# Patient Record
Sex: MALE | Race: WHITE | NOT HISPANIC OR LATINO | Employment: OTHER | URBAN - METROPOLITAN AREA
[De-identification: names, ages, dates, MRNs, and addresses within clinical notes are randomized per-mention and may not be internally consistent; named-entity substitution may affect disease eponyms.]

---

## 2017-04-07 ENCOUNTER — OFFICE VISIT - RIVER FALLS (OUTPATIENT)
Dept: FAMILY MEDICINE | Facility: CLINIC | Age: 67
End: 2017-04-07

## 2017-09-13 ENCOUNTER — OFFICE VISIT - RIVER FALLS (OUTPATIENT)
Dept: FAMILY MEDICINE | Facility: CLINIC | Age: 67
End: 2017-09-13

## 2017-09-13 ASSESSMENT — MIFFLIN-ST. JEOR: SCORE: 1879.61

## 2017-09-14 LAB
CHOLEST SERPL-MCNC: 193 MG/DL
CHOLEST/HDLC SERPL: 4.1 {RATIO}
CREAT SERPL-MCNC: 0.92 MG/DL (ref 0.7–1.25)
GLUCOSE BLD-MCNC: 86 MG/DL (ref 65–99)
HBA1C MFR BLD: 5.3 %
HDLC SERPL-MCNC: 47 MG/DL
LDLC SERPL CALC-MCNC: 109 MG/DL
NONHDLC SERPL-MCNC: 146 MG/DL
PSA SERPL-MCNC: 2.6 NG/ML
TRIGL SERPL-MCNC: 242 MG/DL

## 2017-09-25 ENCOUNTER — COMMUNICATION - RIVER FALLS (OUTPATIENT)
Dept: FAMILY MEDICINE | Facility: CLINIC | Age: 67
End: 2017-09-25

## 2018-09-05 ENCOUNTER — OFFICE VISIT - RIVER FALLS (OUTPATIENT)
Dept: FAMILY MEDICINE | Facility: CLINIC | Age: 68
End: 2018-09-05

## 2018-09-05 ASSESSMENT — MIFFLIN-ST. JEOR: SCORE: 1814.64

## 2018-09-06 LAB
CHOLEST SERPL-MCNC: 175 MG/DL
CHOLEST/HDLC SERPL: 4 {RATIO}
CREAT SERPL-MCNC: 1 MG/DL (ref 0.7–1.25)
GLUCOSE BLD-MCNC: 108 MG/DL (ref 65–99)
HDLC SERPL-MCNC: 44 MG/DL
LDLC SERPL CALC-MCNC: 97 MG/DL
NONHDLC SERPL-MCNC: 131 MG/DL
PSA SERPL-MCNC: 2.7 NG/ML
TRIGL SERPL-MCNC: 222 MG/DL

## 2018-10-10 ENCOUNTER — AMBULATORY - RIVER FALLS (OUTPATIENT)
Dept: FAMILY MEDICINE | Facility: CLINIC | Age: 68
End: 2018-10-10

## 2018-11-15 ENCOUNTER — OFFICE VISIT - RIVER FALLS (OUTPATIENT)
Dept: FAMILY MEDICINE | Facility: CLINIC | Age: 68
End: 2018-11-15

## 2018-11-15 ASSESSMENT — MIFFLIN-ST. JEOR: SCORE: 1792.3

## 2018-11-29 ENCOUNTER — AMBULATORY - RIVER FALLS (OUTPATIENT)
Dept: FAMILY MEDICINE | Facility: CLINIC | Age: 68
End: 2018-11-29

## 2018-12-01 LAB
CREAT SERPL-MCNC: 0.92 MG/DL (ref 0.7–1.25)
GLUCOSE BLD-MCNC: 106 MG/DL (ref 65–99)

## 2019-01-02 ENCOUNTER — AMBULATORY - RIVER FALLS (OUTPATIENT)
Dept: FAMILY MEDICINE | Facility: CLINIC | Age: 69
End: 2019-01-02

## 2019-01-05 LAB
A/G RATIO - HISTORICAL: 2 (ref 1–2.5)
ALBUMIN SERPL-MCNC: 4.4 GM/DL (ref 3.6–5.1)
ALP SERPL-CCNC: 90 UNIT/L (ref 40–115)
ALT SERPL W P-5'-P-CCNC: 43 UNIT/L (ref 9–46)
AST SERPL W P-5'-P-CCNC: 31 UNIT/L (ref 10–35)
BASOPHILS # BLD MANUAL: 350 10*3/UL (ref 0–200)
BASOPHILS NFR BLD MANUAL: 1.1 %
BILIRUB SERPL-MCNC: 0.5 MG/DL (ref 0.2–1.2)
BUN SERPL-MCNC: 19 MG/DL (ref 7–25)
BUN/CREAT RATIO - HISTORICAL: ABNORMAL (ref 6–22)
CALCIUM SERPL-MCNC: 9.3 MG/DL (ref 8.6–10.3)
CHLORIDE BLD-SCNC: 106 MMOL/L (ref 98–110)
CO2 SERPL-SCNC: 27 MMOL/L (ref 20–32)
CREAT SERPL-MCNC: 0.96 MG/DL (ref 0.7–1.25)
EGFRCR SERPLBLD CKD-EPI 2021: 82 ML/MIN/1.73M2
EOSINOPHIL # BLD MANUAL: 0 10*3/UL (ref 15–500)
EOSINOPHIL NFR BLD MANUAL: 0 %
ERYTHROCYTE [DISTWIDTH] IN BLOOD BY AUTOMATED COUNT: 12.5 % (ref 11–15)
GLOBULIN: 2.2 (ref 1.9–3.7)
GLUCOSE BLD-MCNC: 100 MG/DL (ref 65–99)
HCT VFR BLD AUTO: 41.7 % (ref 38.5–50)
HGB BLD-MCNC: 14.2 GM/DL (ref 13.2–17.1)
LACTATE DEHYDROGENASE - 2: 32 % (ref 30–43)
LDH SERPL L TO P-CCNC: 126 UNIT/L (ref 120–250)
LDH1 CFR SERPL ELPH: 23 % (ref 19–38)
LDH3 CFR SERPL ELPH: 23 % (ref 16–26)
LDH4 CFR SERPL ELPH: 10 % (ref 3–12)
LDH5 CFR SERPL ELPH: 12 % (ref 3–14)
LYMPHOCYTES # BLD MANUAL: ABNORMAL 10*3/UL (ref 850–3900)
LYMPHOCYTES NFR BLD MANUAL: 88.5 %
MCH RBC QN AUTO: 31.1 PG (ref 27–33)
MCHC RBC AUTO-ENTMCNC: 34.1 GM/DL (ref 32–36)
MCV RBC AUTO: 91.2 FL (ref 80–100)
MONOCYTES # BLD MANUAL: 1654 10*3/UL (ref 200–950)
MONOCYTES NFR BLD MANUAL: 5.2 %
NEUTROPHILS # BLD MANUAL: 1654 10*3/UL (ref 1500–7800)
NEUTROPHILS NFR BLD MANUAL: 5.2 %
PLATELET # BLD AUTO: 216 10*3/UL (ref 140–400)
PMV BLD: 10.7 FL (ref 7.5–12.5)
POTASSIUM BLD-SCNC: 4.9 MMOL/L (ref 3.5–5.3)
PROT SERPL-MCNC: 6.6 GM/DL (ref 6.1–8.1)
RBC # BLD AUTO: 4.57 10*6/UL (ref 4.2–5.8)
SODIUM SERPL-SCNC: 141 MMOL/L (ref 135–146)
WBC # BLD AUTO: 31.8 10*3/UL (ref 3.8–10.8)

## 2019-01-17 ENCOUNTER — OFFICE VISIT - RIVER FALLS (OUTPATIENT)
Dept: FAMILY MEDICINE | Facility: CLINIC | Age: 69
End: 2019-01-17

## 2019-01-17 ASSESSMENT — MIFFLIN-ST. JEOR: SCORE: 1803.19

## 2019-02-04 ENCOUNTER — COMMUNICATION - RIVER FALLS (OUTPATIENT)
Dept: FAMILY MEDICINE | Facility: CLINIC | Age: 69
End: 2019-02-04

## 2019-02-04 ENCOUNTER — OFFICE VISIT - RIVER FALLS (OUTPATIENT)
Dept: FAMILY MEDICINE | Facility: CLINIC | Age: 69
End: 2019-02-04

## 2019-02-04 LAB
A/G RATIO - HISTORICAL: 1.7 (ref 1–2)
ALBUMIN SERPL-MCNC: 4.4 G/DL (ref 3.2–4.6)
ALP SERPL-CCNC: 86 IU/L (ref 50–136)
ALT SERPL W P-5'-P-CCNC: 43 IU/L (ref 8–45)
ANION GAP SERPL CALCULATED.3IONS-SCNC: 9 MMOL/L (ref 5–18)
AST SERPL W P-5'-P-CCNC: 30 IU/L (ref 2–40)
BILIRUB DIRECT SERPL-MCNC: 0.2 MG/DL (ref 0.1–0.5)
BILIRUB INDIRECT SERPL-MCNC: 0.2 MG/DL (ref 0.2–0.8)
BILIRUB SERPL-MCNC: 0.4 MG/DL (ref 0.2–1.2)
BUN SERPL-MCNC: 19 MG/DL (ref 8–25)
BUN/CREAT RATIO - HISTORICAL: 22 (ref 10–20)
CALCIUM SERPL-MCNC: 9.8 MG/DL (ref 8.5–10.5)
CHLORIDE BLD-SCNC: 109 MMOL/L (ref 98–110)
CO2 SERPL-SCNC: 25 MMOL/L (ref 21–31)
CREAT SERPL-MCNC: 0.86 MG/DL (ref 0.72–1.25)
GFR ESTIMATE EXT - HISTORICAL: >60
GLOBULIN: 2.6 G/DL (ref 2–3.7)
GLUCOSE BLD-MCNC: 105 MG/DL (ref 65–100)
POTASSIUM BLD-SCNC: 4.7 MMOL/L (ref 3.5–5)
PROT SERPL-MCNC: 7 G/DL (ref 6–8)
SODIUM SERPL-SCNC: 143 MMOL/L (ref 135–145)
TROPONIN I - HISTORICAL: <0.01 NG/ML

## 2019-02-08 LAB
BASOPHILS # BLD MANUAL: 96 10*3/UL (ref 0–200)
BASOPHILS NFR BLD MANUAL: 0.3 %
EOSINOPHIL # BLD MANUAL: 96 10*3/UL (ref 15–500)
EOSINOPHIL NFR BLD MANUAL: 0.3 %
ERYTHROCYTE [DISTWIDTH] IN BLOOD BY AUTOMATED COUNT: 13 % (ref 11–15)
HCT VFR BLD AUTO: 40.8 % (ref 38.5–50)
HGB BLD-MCNC: 13.9 GM/DL (ref 13.2–17.1)
LACTATE DEHYDROGENASE - 2: 34 % (ref 30–43)
LDH SERPL L TO P-CCNC: 127 UNIT/L (ref 120–250)
LDH1 CFR SERPL ELPH: 24 % (ref 19–38)
LDH3 CFR SERPL ELPH: 23 % (ref 16–26)
LDH4 CFR SERPL ELPH: 9 % (ref 3–12)
LDH5 CFR SERPL ELPH: 10 % (ref 3–14)
LYMPHOCYTES # BLD MANUAL: ABNORMAL 10*3/UL (ref 850–3900)
LYMPHOCYTES NFR BLD MANUAL: 77 %
MCH RBC QN AUTO: 31.2 PG (ref 27–33)
MCHC RBC AUTO-ENTMCNC: 34.1 GM/DL (ref 32–36)
MCV RBC AUTO: 91.7 FL (ref 80–100)
MONOCYTES # BLD MANUAL: 3008 10*3/UL (ref 200–950)
MONOCYTES NFR BLD MANUAL: 9.4 %
NEUTROPHILS # BLD MANUAL: 4160 10*3/UL (ref 1500–7800)
NEUTROPHILS NFR BLD MANUAL: 13 %
PLATELET # BLD AUTO: 207 10*3/UL (ref 140–400)
PMV BLD: 11 FL (ref 7.5–12.5)
RBC # BLD AUTO: 4.45 10*6/UL (ref 4.2–5.8)
WBC # BLD AUTO: 32 10*3/UL (ref 3.8–10.8)

## 2019-03-04 ENCOUNTER — AMBULATORY - RIVER FALLS (OUTPATIENT)
Dept: FAMILY MEDICINE | Facility: CLINIC | Age: 69
End: 2019-03-04

## 2019-03-09 LAB
A/G RATIO - HISTORICAL: 2.3 (ref 1–2.5)
ALBUMIN SERPL-MCNC: 4.5 GM/DL (ref 3.6–5.1)
ALP SERPL-CCNC: 77 UNIT/L (ref 40–115)
ALT SERPL W P-5'-P-CCNC: 21 UNIT/L (ref 9–46)
AST SERPL W P-5'-P-CCNC: 18 UNIT/L (ref 10–35)
BASOPHILS # BLD MANUAL: 103 10*3/UL (ref 0–200)
BASOPHILS NFR BLD MANUAL: 0.3 %
BILIRUB SERPL-MCNC: 0.6 MG/DL (ref 0.2–1.2)
BUN SERPL-MCNC: 15 MG/DL (ref 7–25)
BUN/CREAT RATIO - HISTORICAL: ABNORMAL (ref 6–22)
CALCIUM SERPL-MCNC: 9.4 MG/DL (ref 8.6–10.3)
CHLORIDE BLD-SCNC: 106 MMOL/L (ref 98–110)
CO2 SERPL-SCNC: 27 MMOL/L (ref 20–32)
CREAT SERPL-MCNC: 0.94 MG/DL (ref 0.7–1.25)
EGFRCR SERPLBLD CKD-EPI 2021: 84 ML/MIN/1.73M2
EOSINOPHIL # BLD MANUAL: 172 10*3/UL (ref 15–500)
EOSINOPHIL NFR BLD MANUAL: 0.5 %
ERYTHROCYTE [DISTWIDTH] IN BLOOD BY AUTOMATED COUNT: 12.9 % (ref 11–15)
GLOBULIN: 2 (ref 1.9–3.7)
GLUCOSE BLD-MCNC: 103 MG/DL (ref 65–99)
HCT VFR BLD AUTO: 40.3 % (ref 38.5–50)
HGB BLD-MCNC: 13.8 GM/DL (ref 13.2–17.1)
LACTATE DEHYDROGENASE - 2: 35 % (ref 30–43)
LDH SERPL L TO P-CCNC: 123 UNIT/L (ref 120–250)
LDH1 CFR SERPL ELPH: 25 % (ref 19–38)
LDH3 CFR SERPL ELPH: 24 % (ref 16–26)
LDH4 CFR SERPL ELPH: 8 % (ref 3–12)
LDH5 CFR SERPL ELPH: 8 % (ref 3–14)
LYMPHOCYTES # BLD MANUAL: ABNORMAL 10*3/UL (ref 850–3900)
LYMPHOCYTES NFR BLD MANUAL: 84.6 %
MCH RBC QN AUTO: 31.2 PG (ref 27–33)
MCHC RBC AUTO-ENTMCNC: 34.2 GM/DL (ref 32–36)
MCV RBC AUTO: 91.2 FL (ref 80–100)
MONOCYTES # BLD MANUAL: 2958 10*3/UL (ref 200–950)
MONOCYTES NFR BLD MANUAL: 8.6 %
NEUTROPHILS # BLD MANUAL: 2064 10*3/UL (ref 1500–7800)
NEUTROPHILS NFR BLD MANUAL: 6 %
PLATELET # BLD AUTO: 195 10*3/UL (ref 140–400)
PMV BLD: 10.5 FL (ref 7.5–12.5)
POTASSIUM BLD-SCNC: 4.4 MMOL/L (ref 3.5–5.3)
PROT SERPL-MCNC: 6.5 GM/DL (ref 6.1–8.1)
RBC # BLD AUTO: 4.42 10*6/UL (ref 4.2–5.8)
SODIUM SERPL-SCNC: 140 MMOL/L (ref 135–146)
WBC # BLD AUTO: 34.4 10*3/UL (ref 3.8–10.8)

## 2019-04-02 ENCOUNTER — AMBULATORY - RIVER FALLS (OUTPATIENT)
Dept: FAMILY MEDICINE | Facility: CLINIC | Age: 69
End: 2019-04-02

## 2019-04-06 LAB
A/G RATIO - HISTORICAL: 2.3 (ref 1–2.5)
ALBUMIN SERPL-MCNC: 4.6 GM/DL (ref 3.6–5.1)
ALP SERPL-CCNC: 89 UNIT/L (ref 40–115)
ALT SERPL W P-5'-P-CCNC: 22 UNIT/L (ref 9–46)
AST SERPL W P-5'-P-CCNC: 19 UNIT/L (ref 10–35)
BASOPHILS # BLD MANUAL: 151 10*3/UL (ref 0–200)
BASOPHILS NFR BLD MANUAL: 0.4 %
BILIRUB SERPL-MCNC: 0.6 MG/DL (ref 0.2–1.2)
BUN SERPL-MCNC: 25 MG/DL (ref 7–25)
BUN/CREAT RATIO - HISTORICAL: ABNORMAL (ref 6–22)
CALCIUM SERPL-MCNC: 9.1 MG/DL (ref 8.6–10.3)
CHLORIDE BLD-SCNC: 107 MMOL/L (ref 98–110)
CO2 SERPL-SCNC: 25 MMOL/L (ref 20–32)
CREAT SERPL-MCNC: 1 MG/DL (ref 0.7–1.25)
EGFRCR SERPLBLD CKD-EPI 2021: 78 ML/MIN/1.73M2
EOSINOPHIL # BLD MANUAL: 38 10*3/UL (ref 15–500)
EOSINOPHIL NFR BLD MANUAL: 0.1 %
ERYTHROCYTE [DISTWIDTH] IN BLOOD BY AUTOMATED COUNT: 14.7 % (ref 11–15)
GLOBULIN: 2 (ref 1.9–3.7)
GLUCOSE BLD-MCNC: 100 MG/DL (ref 65–99)
HCT VFR BLD AUTO: 40.7 % (ref 38.5–50)
HGB BLD-MCNC: 14.1 GM/DL (ref 13.2–17.1)
LACTATE DEHYDROGENASE - 2: 35 % (ref 30–43)
LDH SERPL L TO P-CCNC: 124 UNIT/L (ref 120–250)
LDH1 CFR SERPL ELPH: 24 % (ref 19–38)
LDH3 CFR SERPL ELPH: 23 % (ref 16–26)
LDH4 CFR SERPL ELPH: 9 % (ref 3–12)
LDH5 CFR SERPL ELPH: 9 % (ref 3–14)
LYMPHOCYTES # BLD MANUAL: ABNORMAL 10*3/UL (ref 850–3900)
LYMPHOCYTES NFR BLD MANUAL: 79.4 %
MCH RBC QN AUTO: 32.7 PG (ref 27–33)
MCHC RBC AUTO-ENTMCNC: 34.6 GM/DL (ref 32–36)
MCV RBC AUTO: 94.4 FL (ref 80–100)
MONOCYTES # BLD MANUAL: 5746 10*3/UL (ref 200–950)
MONOCYTES NFR BLD MANUAL: 15.2 %
NEUTROPHILS # BLD MANUAL: 1852 10*3/UL (ref 1500–7800)
NEUTROPHILS NFR BLD MANUAL: 4.9 %
PLATELET # BLD AUTO: 183 10*3/UL (ref 140–400)
PMV BLD: 11.1 FL (ref 7.5–12.5)
POTASSIUM BLD-SCNC: 4.3 MMOL/L (ref 3.5–5.3)
PROT SERPL-MCNC: 6.6 GM/DL (ref 6.1–8.1)
RBC # BLD AUTO: 4.31 10*6/UL (ref 4.2–5.8)
SODIUM SERPL-SCNC: 139 MMOL/L (ref 135–146)
WBC # BLD AUTO: 37.8 10*3/UL (ref 3.8–10.8)

## 2019-05-02 ENCOUNTER — AMBULATORY - RIVER FALLS (OUTPATIENT)
Dept: FAMILY MEDICINE | Facility: CLINIC | Age: 69
End: 2019-05-02

## 2019-05-04 LAB
A/G RATIO - HISTORICAL: 2 (ref 1–2.5)
ALBUMIN SERPL-MCNC: 4.5 GM/DL (ref 3.6–5.1)
ALP SERPL-CCNC: 93 UNIT/L (ref 40–115)
ALT SERPL W P-5'-P-CCNC: 27 UNIT/L (ref 9–46)
AST SERPL W P-5'-P-CCNC: 21 UNIT/L (ref 10–35)
BASOPHILS # BLD MANUAL: 200 10*3/UL (ref 0–200)
BASOPHILS NFR BLD MANUAL: 0.5 %
BILIRUB SERPL-MCNC: 0.6 MG/DL (ref 0.2–1.2)
BUN SERPL-MCNC: 22 MG/DL (ref 7–25)
BUN/CREAT RATIO - HISTORICAL: ABNORMAL (ref 6–22)
CALCIUM SERPL-MCNC: 9.5 MG/DL (ref 8.6–10.3)
CHLORIDE BLD-SCNC: 105 MMOL/L (ref 98–110)
CO2 SERPL-SCNC: 27 MMOL/L (ref 20–32)
CREAT SERPL-MCNC: 1.02 MG/DL (ref 0.7–1.25)
EGFRCR SERPLBLD CKD-EPI 2021: 76 ML/MIN/1.73M2
EOSINOPHIL # BLD MANUAL: 40 10*3/UL (ref 15–500)
EOSINOPHIL NFR BLD MANUAL: 0.1 %
ERYTHROCYTE [DISTWIDTH] IN BLOOD BY AUTOMATED COUNT: 15 % (ref 11–15)
GLOBULIN: 2.3 (ref 1.9–3.7)
GLUCOSE BLD-MCNC: 107 MG/DL (ref 65–99)
HCT VFR BLD AUTO: 41.2 % (ref 38.5–50)
HGB BLD-MCNC: 14.5 GM/DL (ref 13.2–17.1)
LACTATE DEHYDROGENASE - 2: 34 % (ref 30–43)
LDH SERPL L TO P-CCNC: 132 UNIT/L (ref 120–250)
LDH1 CFR SERPL ELPH: 24 % (ref 19–38)
LDH3 CFR SERPL ELPH: 23 % (ref 16–26)
LDH4 CFR SERPL ELPH: 9 % (ref 3–12)
LDH5 CFR SERPL ELPH: 10 % (ref 3–14)
LYMPHOCYTES # BLD MANUAL: ABNORMAL 10*3/UL (ref 850–3900)
LYMPHOCYTES NFR BLD MANUAL: 85.1 %
MCH RBC QN AUTO: 34.2 PG (ref 27–33)
MCHC RBC AUTO-ENTMCNC: 35.2 GM/DL (ref 32–36)
MCV RBC AUTO: 97.2 FL (ref 80–100)
MONOCYTES # BLD MANUAL: 4560 10*3/UL (ref 200–950)
MONOCYTES NFR BLD MANUAL: 11.4 %
NEUTROPHILS # BLD MANUAL: 1160 10*3/UL (ref 1500–7800)
NEUTROPHILS NFR BLD MANUAL: 2.9 %
PLATELET # BLD AUTO: 199 10*3/UL (ref 140–400)
PMV BLD: 10.6 FL (ref 7.5–12.5)
POTASSIUM BLD-SCNC: 4.6 MMOL/L (ref 3.5–5.3)
PROT SERPL-MCNC: 6.8 GM/DL (ref 6.1–8.1)
RBC # BLD AUTO: 4.24 10*6/UL (ref 4.2–5.8)
SODIUM SERPL-SCNC: 140 MMOL/L (ref 135–146)
WBC # BLD AUTO: 40 10*3/UL (ref 3.8–10.8)

## 2019-06-07 ENCOUNTER — COMMUNICATION - RIVER FALLS (OUTPATIENT)
Dept: FAMILY MEDICINE | Facility: CLINIC | Age: 69
End: 2019-06-07

## 2019-07-02 ENCOUNTER — AMBULATORY - RIVER FALLS (OUTPATIENT)
Dept: FAMILY MEDICINE | Facility: CLINIC | Age: 69
End: 2019-07-02

## 2019-07-03 LAB
A/G RATIO - HISTORICAL: 2.3 (ref 1–2.5)
ALBUMIN SERPL-MCNC: 4.5 GM/DL (ref 3.6–5.1)
ALP SERPL-CCNC: 81 UNIT/L (ref 40–115)
ALT SERPL W P-5'-P-CCNC: 25 UNIT/L (ref 9–46)
AST SERPL W P-5'-P-CCNC: 24 UNIT/L (ref 10–35)
BASOPHILS # BLD MANUAL: 0 10*3/UL (ref 0–200)
BASOPHILS NFR BLD MANUAL: 0 %
BILIRUB SERPL-MCNC: 0.7 MG/DL (ref 0.2–1.2)
BUN SERPL-MCNC: 23 MG/DL (ref 7–25)
BUN/CREAT RATIO - HISTORICAL: ABNORMAL (ref 6–22)
CALCIUM SERPL-MCNC: 9.2 MG/DL (ref 8.6–10.3)
CHLORIDE BLD-SCNC: 106 MMOL/L (ref 98–110)
CO2 SERPL-SCNC: 25 MMOL/L (ref 20–32)
CREAT SERPL-MCNC: 0.99 MG/DL (ref 0.7–1.25)
EGFRCR SERPLBLD CKD-EPI 2021: 78 ML/MIN/1.73M2
EOSINOPHIL # BLD MANUAL: 0 10*3/UL (ref 15–500)
EOSINOPHIL NFR BLD MANUAL: 0 %
ERYTHROCYTE [DISTWIDTH] IN BLOOD BY AUTOMATED COUNT: 12.9 % (ref 11–15)
GLOBULIN: 2 (ref 1.9–3.7)
GLUCOSE BLD-MCNC: 104 MG/DL (ref 65–99)
HCT VFR BLD AUTO: 40 % (ref 38.5–50)
HGB BLD-MCNC: 13.5 GM/DL (ref 13.2–17.1)
LDH SERPL L TO P-CCNC: 124 UNIT/L (ref 120–250)
LYMPHOCYTES # BLD MANUAL: ABNORMAL 10*3/UL (ref 850–3900)
LYMPHOCYTES NFR BLD MANUAL: 92 %
MCH RBC QN AUTO: 33.4 PG (ref 27–33)
MCHC RBC AUTO-ENTMCNC: 33.8 GM/DL (ref 32–36)
MCV RBC AUTO: 99 FL (ref 80–100)
MONOCYTES # BLD MANUAL: 2285 10*3/UL (ref 200–950)
MONOCYTES NFR BLD MANUAL: 5 %
NEUTROPHILS # BLD MANUAL: 1371 10*3/UL (ref 1500–7800)
NEUTROPHILS NFR BLD MANUAL: 3 %
PLATELET # BLD AUTO: 173 10*3/UL (ref 140–400)
PMV BLD: 11.1 FL (ref 7.5–12.5)
POTASSIUM BLD-SCNC: 4.8 MMOL/L (ref 3.5–5.3)
PROT SERPL-MCNC: 6.5 GM/DL (ref 6.1–8.1)
RBC # BLD AUTO: 4.04 10*6/UL (ref 4.2–5.8)
SODIUM SERPL-SCNC: 139 MMOL/L (ref 135–146)
WBC # BLD AUTO: 45.7 10*3/UL (ref 3.8–10.8)

## 2019-08-09 ENCOUNTER — COMMUNICATION - RIVER FALLS (OUTPATIENT)
Dept: FAMILY MEDICINE | Facility: CLINIC | Age: 69
End: 2019-08-09

## 2019-09-05 ENCOUNTER — AMBULATORY - RIVER FALLS (OUTPATIENT)
Dept: FAMILY MEDICINE | Facility: CLINIC | Age: 69
End: 2019-09-05

## 2019-09-06 LAB
A/G RATIO - HISTORICAL: 2.1 (ref 1–2.5)
ALBUMIN SERPL-MCNC: 4.7 GM/DL (ref 3.6–5.1)
ALP SERPL-CCNC: 98 UNIT/L (ref 40–115)
ALT SERPL W P-5'-P-CCNC: 28 UNIT/L (ref 9–46)
AST SERPL W P-5'-P-CCNC: 22 UNIT/L (ref 10–35)
BASOPHILS # BLD MANUAL: 0 10*3/UL (ref 0–200)
BASOPHILS NFR BLD MANUAL: 0 %
BILIRUB SERPL-MCNC: 0.9 MG/DL (ref 0.2–1.2)
BUN SERPL-MCNC: 16 MG/DL (ref 7–25)
BUN/CREAT RATIO - HISTORICAL: ABNORMAL (ref 6–22)
CALCIUM SERPL-MCNC: 9.8 MG/DL (ref 8.6–10.3)
CHLORIDE BLD-SCNC: 104 MMOL/L (ref 98–110)
CHOLEST SERPL-MCNC: 167 MG/DL
CHOLEST/HDLC SERPL: 3.8 {RATIO}
CO2 SERPL-SCNC: 25 MMOL/L (ref 20–32)
CREAT SERPL-MCNC: 1.07 MG/DL (ref 0.7–1.25)
EGFRCR SERPLBLD CKD-EPI 2021: 71 ML/MIN/1.73M2
EOSINOPHIL # BLD MANUAL: 0 10*3/UL (ref 15–500)
EOSINOPHIL NFR BLD MANUAL: 0 %
ERYTHROCYTE [DISTWIDTH] IN BLOOD BY AUTOMATED COUNT: 12.5 % (ref 11–15)
GLOBULIN: 2.2 (ref 1.9–3.7)
GLUCOSE BLD-MCNC: 103 MG/DL (ref 65–99)
HCT VFR BLD AUTO: 44.4 % (ref 38.5–50)
HDLC SERPL-MCNC: 44 MG/DL
HGB BLD-MCNC: 14.7 GM/DL (ref 13.2–17.1)
LDH SERPL L TO P-CCNC: 125 UNIT/L (ref 120–250)
LDLC SERPL CALC-MCNC: 97 MG/DL
LYMPHOCYTES # BLD MANUAL: ABNORMAL 10*3/UL (ref 850–3900)
LYMPHOCYTES NFR BLD MANUAL: 94.9 %
MCH RBC QN AUTO: 32.2 PG (ref 27–33)
MCHC RBC AUTO-ENTMCNC: 33.1 GM/DL (ref 32–36)
MCV RBC AUTO: 97.2 FL (ref 80–100)
MONOCYTES # BLD MANUAL: 1146 10*3/UL (ref 200–950)
MONOCYTES NFR BLD MANUAL: 2 %
NEUTROPHILS # BLD MANUAL: 1776 10*3/UL (ref 1500–7800)
NEUTROPHILS NFR BLD MANUAL: 3.1 %
NONHDLC SERPL-MCNC: 123 MG/DL
PLATELET # BLD AUTO: 206 10*3/UL (ref 140–400)
PMV BLD: 11.5 FL (ref 7.5–12.5)
POTASSIUM BLD-SCNC: 4.9 MMOL/L (ref 3.5–5.3)
PROT SERPL-MCNC: 6.9 GM/DL (ref 6.1–8.1)
PSA SERPL-MCNC: 3.3 NG/ML
RBC # BLD AUTO: 4.57 10*6/UL (ref 4.2–5.8)
SODIUM SERPL-SCNC: 140 MMOL/L (ref 135–146)
TRIGL SERPL-MCNC: 158 MG/DL
WBC # BLD AUTO: 57.3 10*3/UL (ref 3.8–10.8)

## 2019-09-11 ENCOUNTER — COMMUNICATION - RIVER FALLS (OUTPATIENT)
Dept: FAMILY MEDICINE | Facility: CLINIC | Age: 69
End: 2019-09-11

## 2019-09-30 ENCOUNTER — OFFICE VISIT - RIVER FALLS (OUTPATIENT)
Dept: FAMILY MEDICINE | Facility: CLINIC | Age: 69
End: 2019-09-30

## 2019-09-30 ASSESSMENT — MIFFLIN-ST. JEOR: SCORE: 1843.1

## 2019-10-01 ENCOUNTER — COMMUNICATION - RIVER FALLS (OUTPATIENT)
Dept: FAMILY MEDICINE | Facility: CLINIC | Age: 69
End: 2019-10-01

## 2019-10-01 LAB — PSA SERPL-MCNC: 3.2 NG/ML

## 2019-10-09 ENCOUNTER — COMMUNICATION - RIVER FALLS (OUTPATIENT)
Dept: FAMILY MEDICINE | Facility: CLINIC | Age: 69
End: 2019-10-09

## 2019-11-06 ENCOUNTER — AMBULATORY - RIVER FALLS (OUTPATIENT)
Dept: FAMILY MEDICINE | Facility: CLINIC | Age: 69
End: 2019-11-06

## 2019-11-07 LAB
A/G RATIO - HISTORICAL: 2 (ref 1–2.5)
ALBUMIN SERPL-MCNC: 4.4 GM/DL (ref 3.6–5.1)
ALP SERPL-CCNC: 98 UNIT/L (ref 40–115)
ALT SERPL W P-5'-P-CCNC: 41 UNIT/L (ref 9–46)
AST SERPL W P-5'-P-CCNC: 27 UNIT/L (ref 10–35)
BASOPHILS # BLD MANUAL: 0 10*3/UL (ref 0–200)
BASOPHILS NFR BLD MANUAL: 0 %
BILIRUB SERPL-MCNC: 0.5 MG/DL (ref 0.2–1.2)
BUN SERPL-MCNC: 17 MG/DL (ref 7–25)
BUN/CREAT RATIO - HISTORICAL: ABNORMAL (ref 6–22)
CALCIUM SERPL-MCNC: 9.3 MG/DL (ref 8.6–10.3)
CHLORIDE BLD-SCNC: 106 MMOL/L (ref 98–110)
CO2 SERPL-SCNC: 28 MMOL/L (ref 20–32)
CREAT SERPL-MCNC: 0.91 MG/DL (ref 0.7–1.25)
EGFRCR SERPLBLD CKD-EPI 2021: 87 ML/MIN/1.73M2
EOSINOPHIL # BLD MANUAL: 615 10*3/UL (ref 15–500)
EOSINOPHIL NFR BLD MANUAL: 1 %
ERYTHROCYTE [DISTWIDTH] IN BLOOD BY AUTOMATED COUNT: 13 % (ref 11–15)
GLOBULIN: 2.2 (ref 1.9–3.7)
GLUCOSE BLD-MCNC: 101 MG/DL (ref 65–99)
HCT VFR BLD AUTO: 39.5 % (ref 38.5–50)
HGB BLD-MCNC: 13.7 GM/DL (ref 13.2–17.1)
LDH SERPL L TO P-CCNC: 117 UNIT/L (ref 120–250)
LYMPHOCYTES # BLD MANUAL: ABNORMAL 10*3/UL (ref 850–3900)
LYMPHOCYTES NFR BLD MANUAL: 91 %
MCH RBC QN AUTO: 32.7 PG (ref 27–33)
MCHC RBC AUTO-ENTMCNC: 34.7 GM/DL (ref 32–36)
MCV RBC AUTO: 94.3 FL (ref 80–100)
MONOCYTES # BLD MANUAL: 3075 10*3/UL (ref 200–950)
MONOCYTES NFR BLD MANUAL: 5 %
NEUTROPHILS # BLD MANUAL: 1845 10*3/UL (ref 1500–7800)
NEUTROPHILS NFR BLD MANUAL: 3 %
PLATELET # BLD AUTO: 164 10*3/UL (ref 140–400)
PMV BLD: 11 FL (ref 7.5–12.5)
POTASSIUM BLD-SCNC: 4 MMOL/L (ref 3.5–5.3)
PROT SERPL-MCNC: 6.6 GM/DL (ref 6.1–8.1)
RBC # BLD AUTO: 4.19 10*6/UL (ref 4.2–5.8)
SODIUM SERPL-SCNC: 140 MMOL/L (ref 135–146)
WBC # BLD AUTO: 61.5 10*3/UL (ref 3.8–10.8)

## 2019-12-16 ENCOUNTER — COMMUNICATION - RIVER FALLS (OUTPATIENT)
Dept: FAMILY MEDICINE | Facility: CLINIC | Age: 69
End: 2019-12-16

## 2020-01-06 ENCOUNTER — AMBULATORY - RIVER FALLS (OUTPATIENT)
Dept: FAMILY MEDICINE | Facility: CLINIC | Age: 70
End: 2020-01-06

## 2020-01-07 LAB
A/G RATIO - HISTORICAL: 2.1 (ref 1–2.5)
ALBUMIN SERPL-MCNC: 4.7 GM/DL (ref 3.6–5.1)
ALP SERPL-CCNC: 111 UNIT/L (ref 40–115)
ALT SERPL W P-5'-P-CCNC: 28 UNIT/L (ref 9–46)
AST SERPL W P-5'-P-CCNC: 24 UNIT/L (ref 10–35)
BASOPHILS # BLD MANUAL: 0 10*3/UL (ref 0–200)
BASOPHILS NFR BLD MANUAL: 0 %
BILIRUB SERPL-MCNC: 0.6 MG/DL (ref 0.2–1.2)
BUN SERPL-MCNC: 19 MG/DL (ref 7–25)
BUN/CREAT RATIO - HISTORICAL: ABNORMAL (ref 6–22)
CALCIUM SERPL-MCNC: 9.7 MG/DL (ref 8.6–10.3)
CHLORIDE BLD-SCNC: 104 MMOL/L (ref 98–110)
CO2 SERPL-SCNC: 31 MMOL/L (ref 20–32)
CREAT SERPL-MCNC: 1.01 MG/DL (ref 0.7–1.25)
EGFRCR SERPLBLD CKD-EPI 2021: 77 ML/MIN/1.73M2
EOSINOPHIL # BLD MANUAL: 1530 10*3/UL (ref 15–500)
EOSINOPHIL NFR BLD MANUAL: 2 %
ERYTHROCYTE [DISTWIDTH] IN BLOOD BY AUTOMATED COUNT: 13 % (ref 11–15)
GLOBULIN: 2.2 (ref 1.9–3.7)
GLUCOSE BLD-MCNC: 104 MG/DL (ref 65–99)
HCT VFR BLD AUTO: 40.3 % (ref 38.5–50)
HGB BLD-MCNC: 13.9 GM/DL (ref 13.2–17.1)
LDH SERPL L TO P-CCNC: 116 UNIT/L (ref 120–250)
LYMPHOCYTES # BLD MANUAL: ABNORMAL 10*3/UL (ref 850–3900)
LYMPHOCYTES NFR BLD MANUAL: 90.8 %
MCH RBC QN AUTO: 33.3 PG (ref 27–33)
MCHC RBC AUTO-ENTMCNC: 34.5 GM/DL (ref 32–36)
MCV RBC AUTO: 96.4 FL (ref 80–100)
MONOCYTES # BLD MANUAL: 3137 10*3/UL (ref 200–950)
MONOCYTES NFR BLD MANUAL: 4.1 %
NEUTROPHILS # BLD MANUAL: 2372 10*3/UL (ref 1500–7800)
NEUTROPHILS NFR BLD MANUAL: 3.1 %
PLATELET # BLD AUTO: 156 10*3/UL (ref 140–400)
PMV BLD: 11.3 FL (ref 7.5–12.5)
POTASSIUM BLD-SCNC: 4.9 MMOL/L (ref 3.5–5.3)
PROT SERPL-MCNC: 6.9 GM/DL (ref 6.1–8.1)
RBC # BLD AUTO: 4.18 10*6/UL (ref 4.2–5.8)
SODIUM SERPL-SCNC: 139 MMOL/L (ref 135–146)
WBC # BLD AUTO: 76.5 10*3/UL (ref 3.8–10.8)

## 2020-03-06 ENCOUNTER — COMMUNICATION - RIVER FALLS (OUTPATIENT)
Dept: FAMILY MEDICINE | Facility: CLINIC | Age: 70
End: 2020-03-06

## 2020-03-19 ENCOUNTER — AMBULATORY - RIVER FALLS (OUTPATIENT)
Dept: FAMILY MEDICINE | Facility: CLINIC | Age: 70
End: 2020-03-19

## 2020-03-20 LAB
A/G RATIO - HISTORICAL: 2.4 (ref 1–2.5)
ALBUMIN SERPL-MCNC: 4.8 GM/DL (ref 3.6–5.1)
ALP SERPL-CCNC: 118 UNIT/L (ref 35–144)
ALT SERPL W P-5'-P-CCNC: 26 UNIT/L (ref 9–46)
AST SERPL W P-5'-P-CCNC: 22 UNIT/L (ref 10–35)
BASOPHILS # BLD MANUAL: 976 10*3/UL (ref 0–200)
BASOPHILS NFR BLD MANUAL: 1 %
BILIRUB SERPL-MCNC: 0.5 MG/DL (ref 0.2–1.2)
BUN SERPL-MCNC: 19 MG/DL (ref 7–25)
BUN/CREAT RATIO - HISTORICAL: ABNORMAL (ref 6–22)
CALCIUM SERPL-MCNC: 9.6 MG/DL (ref 8.6–10.3)
CHLORIDE BLD-SCNC: 105 MMOL/L (ref 98–110)
CO2 SERPL-SCNC: 28 MMOL/L (ref 20–32)
CREAT SERPL-MCNC: 1.03 MG/DL (ref 0.7–1.25)
EGFRCR SERPLBLD CKD-EPI 2021: 75 ML/MIN/1.73M2
EOSINOPHIL # BLD MANUAL: 0 10*3/UL (ref 15–500)
EOSINOPHIL NFR BLD MANUAL: 0 %
ERYTHROCYTE [DISTWIDTH] IN BLOOD BY AUTOMATED COUNT: 12.7 % (ref 11–15)
GLOBULIN: 2 (ref 1.9–3.7)
GLUCOSE BLD-MCNC: 101 MG/DL (ref 65–99)
HCT VFR BLD AUTO: 40.8 % (ref 38.5–50)
HGB BLD-MCNC: 14 GM/DL (ref 13.2–17.1)
LDH SERPL L TO P-CCNC: 144 UNIT/L (ref 120–250)
LYMPHOCYTES # BLD MANUAL: ABNORMAL 10*3/UL (ref 850–3900)
LYMPHOCYTES NFR BLD MANUAL: 88.8 %
MCH RBC QN AUTO: 32.9 PG (ref 27–33)
MCHC RBC AUTO-ENTMCNC: 34.3 GM/DL (ref 32–36)
MCV RBC AUTO: 96 FL (ref 80–100)
MONOCYTES # BLD MANUAL: 5954 10*3/UL (ref 200–950)
MONOCYTES NFR BLD MANUAL: 6.1 %
NEUTROPHILS # BLD MANUAL: 4002 10*3/UL (ref 1500–7800)
NEUTROPHILS NFR BLD MANUAL: 4.1 %
PLATELET # BLD AUTO: 164 10*3/UL (ref 140–400)
PMV BLD: 11.1 FL (ref 7.5–12.5)
POTASSIUM BLD-SCNC: 4.9 MMOL/L (ref 3.5–5.3)
PROT SERPL-MCNC: 6.8 GM/DL (ref 6.1–8.1)
RBC # BLD AUTO: 4.25 10*6/UL (ref 4.2–5.8)
SODIUM SERPL-SCNC: 140 MMOL/L (ref 135–146)
WBC # BLD AUTO: 97.6 10*3/UL (ref 3.8–10.8)

## 2020-06-16 ENCOUNTER — AMBULATORY - RIVER FALLS (OUTPATIENT)
Dept: FAMILY MEDICINE | Facility: CLINIC | Age: 70
End: 2020-06-16

## 2020-06-17 ENCOUNTER — COMMUNICATION - RIVER FALLS (OUTPATIENT)
Dept: FAMILY MEDICINE | Facility: CLINIC | Age: 70
End: 2020-06-17

## 2020-06-17 LAB
A/G RATIO - HISTORICAL: 2.4 (ref 1–2.5)
ALBUMIN SERPL-MCNC: 4.6 GM/DL (ref 3.6–5.1)
ALP SERPL-CCNC: 120 UNIT/L (ref 35–144)
ALT SERPL W P-5'-P-CCNC: 28 UNIT/L (ref 9–46)
AST SERPL W P-5'-P-CCNC: 23 UNIT/L (ref 10–35)
BASOPHILS # BLD MANUAL: 0 10*3/UL (ref 0–200)
BASOPHILS NFR BLD MANUAL: 0 %
BILIRUB SERPL-MCNC: 0.7 MG/DL (ref 0.2–1.2)
BUN SERPL-MCNC: 25 MG/DL (ref 7–25)
BUN/CREAT RATIO - HISTORICAL: NORMAL (ref 6–22)
CALCIUM SERPL-MCNC: 9.8 MG/DL (ref 8.6–10.3)
CHLORIDE BLD-SCNC: 107 MMOL/L (ref 98–110)
CO2 SERPL-SCNC: 26 MMOL/L (ref 20–32)
CREAT SERPL-MCNC: 0.97 MG/DL (ref 0.7–1.25)
EGFRCR SERPLBLD CKD-EPI 2021: 80 ML/MIN/1.73M2
EOSINOPHIL # BLD MANUAL: 0 10*3/UL (ref 15–500)
EOSINOPHIL NFR BLD MANUAL: 0 %
ERYTHROCYTE [DISTWIDTH] IN BLOOD BY AUTOMATED COUNT: 12.7 % (ref 11–15)
GLOBULIN: 1.9 (ref 1.9–3.7)
GLUCOSE BLD-MCNC: 98 MG/DL (ref 65–99)
HCT VFR BLD AUTO: 37.9 % (ref 38.5–50)
HGB BLD-MCNC: 12.7 GM/DL (ref 13.2–17.1)
LDH SERPL L TO P-CCNC: 132 UNIT/L (ref 120–250)
LYMPHOCYTES # BLD MANUAL: ABNORMAL 10*3/UL (ref 850–3900)
LYMPHOCYTES NFR BLD MANUAL: 89 %
MCH RBC QN AUTO: 32.5 PG (ref 27–33)
MCHC RBC AUTO-ENTMCNC: 33.5 GM/DL (ref 32–36)
MCV RBC AUTO: 96.9 FL (ref 80–100)
MONOCYTES # BLD MANUAL: 3812 10*3/UL (ref 200–950)
MONOCYTES NFR BLD MANUAL: 4 %
NEUTROPHILS # BLD MANUAL: 6671 10*3/UL (ref 1500–7800)
NEUTROPHILS NFR BLD MANUAL: 7 %
PLATELET # BLD AUTO: 157 10*3/UL (ref 140–400)
PMV BLD: 11.2 FL (ref 7.5–12.5)
POTASSIUM BLD-SCNC: 4.2 MMOL/L (ref 3.5–5.3)
PROT SERPL-MCNC: 6.5 GM/DL (ref 6.1–8.1)
RBC # BLD AUTO: 3.91 10*6/UL (ref 4.2–5.8)
SARS-COV-2 AB PNL SERPL IA: NEGATIVE
SODIUM SERPL-SCNC: 141 MMOL/L (ref 135–146)
WBC # BLD AUTO: 95.3 10*3/UL (ref 3.8–10.8)

## 2020-07-02 ENCOUNTER — OFFICE VISIT - RIVER FALLS (OUTPATIENT)
Dept: FAMILY MEDICINE | Facility: CLINIC | Age: 70
End: 2020-07-02

## 2020-07-06 ENCOUNTER — OFFICE VISIT - RIVER FALLS (OUTPATIENT)
Dept: FAMILY MEDICINE | Facility: CLINIC | Age: 70
End: 2020-07-06

## 2020-07-13 ENCOUNTER — OFFICE VISIT - RIVER FALLS (OUTPATIENT)
Dept: FAMILY MEDICINE | Facility: CLINIC | Age: 70
End: 2020-07-13

## 2020-07-14 ENCOUNTER — COMMUNICATION - RIVER FALLS (OUTPATIENT)
Dept: FAMILY MEDICINE | Facility: CLINIC | Age: 70
End: 2020-07-14

## 2020-07-17 ENCOUNTER — COMMUNICATION - RIVER FALLS (OUTPATIENT)
Dept: FAMILY MEDICINE | Facility: CLINIC | Age: 70
End: 2020-07-17

## 2020-07-28 ENCOUNTER — COMMUNICATION - RIVER FALLS (OUTPATIENT)
Dept: FAMILY MEDICINE | Facility: CLINIC | Age: 70
End: 2020-07-28

## 2020-07-30 ENCOUNTER — OFFICE VISIT - RIVER FALLS (OUTPATIENT)
Dept: FAMILY MEDICINE | Facility: CLINIC | Age: 70
End: 2020-07-30

## 2020-07-30 ASSESSMENT — MIFFLIN-ST. JEOR: SCORE: 1825.87

## 2020-08-27 ENCOUNTER — COMMUNICATION - RIVER FALLS (OUTPATIENT)
Dept: FAMILY MEDICINE | Facility: CLINIC | Age: 70
End: 2020-08-27

## 2020-09-15 ENCOUNTER — AMBULATORY - RIVER FALLS (OUTPATIENT)
Dept: FAMILY MEDICINE | Facility: CLINIC | Age: 70
End: 2020-09-15

## 2020-09-16 ENCOUNTER — COMMUNICATION - RIVER FALLS (OUTPATIENT)
Dept: FAMILY MEDICINE | Facility: CLINIC | Age: 70
End: 2020-09-16

## 2020-09-16 LAB
A/G RATIO - HISTORICAL: 2.3 (ref 1–2.5)
ALBUMIN SERPL-MCNC: 4.6 GM/DL (ref 3.6–5.1)
ALP SERPL-CCNC: 150 UNIT/L (ref 35–144)
ALT SERPL W P-5'-P-CCNC: 20 UNIT/L (ref 9–46)
AST SERPL W P-5'-P-CCNC: 22 UNIT/L (ref 10–35)
BASOPHILS # BLD MANUAL: 897 10*3/UL (ref 0–200)
BASOPHILS NFR BLD MANUAL: 0.7 %
BILIRUB SERPL-MCNC: 0.6 MG/DL (ref 0.2–1.2)
BUN SERPL-MCNC: 20 MG/DL (ref 7–25)
BUN/CREAT RATIO - HISTORICAL: ABNORMAL (ref 6–22)
CALCIUM SERPL-MCNC: 9.4 MG/DL (ref 8.6–10.3)
CHLORIDE BLD-SCNC: 106 MMOL/L (ref 98–110)
CHOLEST SERPL-MCNC: 153 MG/DL
CHOLEST/HDLC SERPL: 4.1 {RATIO}
CO2 SERPL-SCNC: 24 MMOL/L (ref 20–32)
CREAT SERPL-MCNC: 1.01 MG/DL (ref 0.7–1.25)
EGFRCR SERPLBLD CKD-EPI 2021: 76 ML/MIN/1.73M2
EOSINOPHIL # BLD MANUAL: 384 10*3/UL (ref 15–500)
EOSINOPHIL NFR BLD MANUAL: 0.3 %
ERYTHROCYTE [DISTWIDTH] IN BLOOD BY AUTOMATED COUNT: 13.8 % (ref 11–15)
GLOBULIN: 2 (ref 1.9–3.7)
GLUCOSE BLD-MCNC: 101 MG/DL (ref 65–99)
HCT VFR BLD AUTO: 38 % (ref 38.5–50)
HDLC SERPL-MCNC: 37 MG/DL
HGB BLD-MCNC: 12 GM/DL (ref 13.2–17.1)
LDH SERPL L TO P-CCNC: 123 UNIT/L (ref 120–250)
LDLC SERPL CALC-MCNC: 89 MG/DL
LYMPHOCYTES # BLD MANUAL: ABNORMAL 10*3/UL (ref 850–3900)
LYMPHOCYTES NFR BLD MANUAL: 91.1 %
MCH RBC QN AUTO: 31.2 PG (ref 27–33)
MCHC RBC AUTO-ENTMCNC: 31.6 GM/DL (ref 32–36)
MCV RBC AUTO: 98.7 FL (ref 80–100)
MONOCYTES # BLD MANUAL: 6789 10*3/UL (ref 200–950)
MONOCYTES NFR BLD MANUAL: 5.3 %
NEUTROPHILS # BLD MANUAL: 3331 10*3/UL (ref 1500–7800)
NEUTROPHILS NFR BLD MANUAL: 2.6 %
NONHDLC SERPL-MCNC: 116 MG/DL
PLATELET # BLD AUTO: 165 10*3/UL (ref 140–400)
PMV BLD: 11.5 FL (ref 7.5–12.5)
POTASSIUM BLD-SCNC: 3.9 MMOL/L (ref 3.5–5.3)
PROT SERPL-MCNC: 6.6 GM/DL (ref 6.1–8.1)
PSA SERPL-MCNC: 4.4 NG/ML
RBC # BLD AUTO: 3.85 10*6/UL (ref 4.2–5.8)
SODIUM SERPL-SCNC: 140 MMOL/L (ref 135–146)
TRIGL SERPL-MCNC: 178 MG/DL
WBC # BLD AUTO: 128.1 10*3/UL (ref 3.8–10.8)

## 2020-10-01 ENCOUNTER — AMBULATORY - RIVER FALLS (OUTPATIENT)
Dept: FAMILY MEDICINE | Facility: CLINIC | Age: 70
End: 2020-10-01

## 2020-10-26 ENCOUNTER — COMMUNICATION - RIVER FALLS (OUTPATIENT)
Dept: FAMILY MEDICINE | Facility: CLINIC | Age: 70
End: 2020-10-26

## 2020-10-28 ENCOUNTER — OFFICE VISIT - RIVER FALLS (OUTPATIENT)
Dept: FAMILY MEDICINE | Facility: CLINIC | Age: 70
End: 2020-10-28

## 2020-10-28 ASSESSMENT — MIFFLIN-ST. JEOR: SCORE: 1834.03

## 2020-10-29 ENCOUNTER — COMMUNICATION - RIVER FALLS (OUTPATIENT)
Dept: FAMILY MEDICINE | Facility: CLINIC | Age: 70
End: 2020-10-29

## 2020-12-17 ENCOUNTER — COMMUNICATION - RIVER FALLS (OUTPATIENT)
Dept: FAMILY MEDICINE | Facility: CLINIC | Age: 70
End: 2020-12-17

## 2021-02-26 ENCOUNTER — OFFICE VISIT - RIVER FALLS (OUTPATIENT)
Dept: FAMILY MEDICINE | Facility: CLINIC | Age: 71
End: 2021-02-26

## 2021-02-26 ASSESSMENT — MIFFLIN-ST. JEOR: SCORE: 1823.14

## 2021-03-01 ENCOUNTER — COMMUNICATION - RIVER FALLS (OUTPATIENT)
Dept: FAMILY MEDICINE | Facility: CLINIC | Age: 71
End: 2021-03-01

## 2021-06-23 ENCOUNTER — OFFICE VISIT - RIVER FALLS (OUTPATIENT)
Dept: FAMILY MEDICINE | Facility: CLINIC | Age: 71
End: 2021-06-23

## 2021-06-23 LAB
CALCIUM SERPL-MCNC: 8.7 MG/DL (ref 8.6–10.3)
CREAT SERPL-MCNC: 0.85 MG/DL (ref 0.7–1.25)
EGFRCR SERPLBLD CKD-EPI 2021: 89 ML/MIN/1.73M2
PHOSPHATE SERPL-MCNC: 3.8 MG/DL (ref 2.1–4.3)
POTASSIUM BLD-SCNC: 4 MMOL/L (ref 3.5–5.3)
URATE SERPL-MCNC: 3.8 MG/DL (ref 4–8)

## 2021-06-30 ENCOUNTER — OFFICE VISIT - RIVER FALLS (OUTPATIENT)
Dept: FAMILY MEDICINE | Facility: CLINIC | Age: 71
End: 2021-06-30

## 2021-06-30 LAB
CALCIUM SERPL-MCNC: 9.3 MG/DL (ref 8.6–10.3)
CREAT SERPL-MCNC: 0.9 MG/DL (ref 0.7–1.25)
EGFRCR SERPLBLD CKD-EPI 2021: 87 ML/MIN/1.73M2
PHOSPHATE SERPL-MCNC: 4.1 MG/DL (ref 2.1–4.3)
POTASSIUM BLD-SCNC: 3.9 MMOL/L (ref 3.5–5.3)
URATE SERPL-MCNC: 3.9 MG/DL (ref 4–8)

## 2021-07-07 ENCOUNTER — OFFICE VISIT - RIVER FALLS (OUTPATIENT)
Dept: FAMILY MEDICINE | Facility: CLINIC | Age: 71
End: 2021-07-07

## 2021-07-07 LAB
CALCIUM SERPL-MCNC: 8.7 MG/DL (ref 8.6–10.3)
CREAT SERPL-MCNC: 0.9 MG/DL (ref 0.7–1.25)
EGFRCR SERPLBLD CKD-EPI 2021: 87 ML/MIN/1.73M2
PHOSPHATE SERPL-MCNC: 3.6 MG/DL (ref 2.1–4.3)
POTASSIUM BLD-SCNC: 4 MMOL/L (ref 3.5–5.3)
URATE SERPL-MCNC: 3.9 MG/DL (ref 4–8)

## 2021-07-13 ENCOUNTER — AMBULATORY - RIVER FALLS (OUTPATIENT)
Dept: FAMILY MEDICINE | Facility: CLINIC | Age: 71
End: 2021-07-13

## 2021-07-14 LAB
BASOPHILS # BLD MANUAL: 11 10*3/UL (ref 0–200)
BASOPHILS NFR BLD MANUAL: 0.2 %
CALCIUM SERPL-MCNC: 8.5 MG/DL (ref 8.6–10.3)
CREAT SERPL-MCNC: 0.91 MG/DL (ref 0.7–1.25)
EGFRCR SERPLBLD CKD-EPI 2021: 86 ML/MIN/1.73M2
EOSINOPHIL # BLD MANUAL: 38 10*3/UL (ref 15–500)
EOSINOPHIL NFR BLD MANUAL: 0.7 %
ERYTHROCYTE [DISTWIDTH] IN BLOOD BY AUTOMATED COUNT: 13 % (ref 11–15)
HCT VFR BLD AUTO: 39.7 % (ref 38.5–50)
HGB BLD-MCNC: 13.6 GM/DL (ref 13.2–17.1)
LDH SERPL L TO P-CCNC: 132 UNIT/L (ref 120–250)
LYMPHOCYTES # BLD MANUAL: 2414 10*3/UL (ref 850–3900)
LYMPHOCYTES NFR BLD MANUAL: 44.7 %
MCH RBC QN AUTO: 32.4 PG (ref 27–33)
MCHC RBC AUTO-ENTMCNC: 34.3 GM/DL (ref 32–36)
MCV RBC AUTO: 94.5 FL (ref 80–100)
MONOCYTES # BLD MANUAL: 508 10*3/UL (ref 200–950)
MONOCYTES NFR BLD MANUAL: 9.4 %
NEUTROPHILS # BLD MANUAL: 2430 10*3/UL (ref 1500–7800)
NEUTROPHILS NFR BLD MANUAL: 45 %
PHOSPHATE SERPL-MCNC: 3.9 MG/DL (ref 2.1–4.3)
PLATELET # BLD AUTO: 100 10*3/UL (ref 140–400)
PMV BLD: 11.1 FL (ref 7.5–12.5)
POTASSIUM BLD-SCNC: 4.2 MMOL/L (ref 3.5–5.3)
RBC # BLD AUTO: 4.2 10*6/UL (ref 4.2–5.8)
URATE SERPL-MCNC: 4.4 MG/DL (ref 4–8)
WBC # BLD AUTO: 5.4 10*3/UL (ref 3.8–10.8)

## 2021-08-16 ENCOUNTER — COMMUNICATION - RIVER FALLS (OUTPATIENT)
Dept: FAMILY MEDICINE | Facility: CLINIC | Age: 71
End: 2021-08-16

## 2021-08-24 ENCOUNTER — COMMUNICATION - RIVER FALLS (OUTPATIENT)
Dept: FAMILY MEDICINE | Facility: CLINIC | Age: 71
End: 2021-08-24

## 2021-10-05 ENCOUNTER — AMBULATORY - RIVER FALLS (OUTPATIENT)
Dept: FAMILY MEDICINE | Facility: CLINIC | Age: 71
End: 2021-10-05

## 2021-11-01 ENCOUNTER — OFFICE VISIT - RIVER FALLS (OUTPATIENT)
Dept: FAMILY MEDICINE | Facility: CLINIC | Age: 71
End: 2021-11-01

## 2021-11-01 ENCOUNTER — TRANSFERRED RECORDS (OUTPATIENT)
Dept: MULTI SPECIALTY CLINIC | Facility: CLINIC | Age: 71
End: 2021-11-01

## 2021-11-01 LAB
BASOPHILS # BLD MANUAL: 9 10*3/UL (ref 0–200)
BASOPHILS NFR BLD MANUAL: 0.3 %
EOSINOPHIL # BLD MANUAL: 30 10*3/UL (ref 15–500)
EOSINOPHIL NFR BLD MANUAL: 1 %
ERYTHROCYTE [DISTWIDTH] IN BLOOD BY AUTOMATED COUNT: 13.5 % (ref 11–15)
HCT VFR BLD AUTO: 41.9 % (ref 38.5–50)
HGB BLD-MCNC: 14.4 GM/DL (ref 13.2–17.1)
LYMPHOCYTES # BLD MANUAL: 1275 10*3/UL (ref 850–3900)
LYMPHOCYTES NFR BLD MANUAL: 42.5 %
MCH RBC QN AUTO: 33.7 PG (ref 27–33)
MCHC RBC AUTO-ENTMCNC: 34.4 GM/DL (ref 32–36)
MCV RBC AUTO: 98.1 FL (ref 80–100)
MONOCYTES # BLD MANUAL: 642 10*3/UL (ref 200–950)
MONOCYTES NFR BLD MANUAL: 21.4 %
NEUTROPHILS # BLD MANUAL: 1044 10*3/UL (ref 1500–7800)
NEUTROPHILS NFR BLD MANUAL: 34.8 %
PLATELET # BLD AUTO: 159 10*3/UL (ref 140–400)
PMV BLD: 10.2 FL (ref 7.5–12.5)
RBC # BLD AUTO: 4.27 10*6/UL (ref 4.2–5.8)
WBC # BLD AUTO: 3 10*3/UL (ref 3.8–10.8)

## 2021-11-01 ASSESSMENT — MIFFLIN-ST. JEOR: SCORE: 1824.05

## 2021-11-02 ENCOUNTER — COMMUNICATION - RIVER FALLS (OUTPATIENT)
Dept: FAMILY MEDICINE | Facility: CLINIC | Age: 71
End: 2021-11-02

## 2021-11-02 LAB
CHOLEST SERPL-MCNC: 160 MG/DL
CHOLEST/HDLC SERPL: 2.4 {RATIO}
HDLC SERPL-MCNC: 68 MG/DL
LDLC SERPL CALC-MCNC: 73 MG/DL
NONHDLC SERPL-MCNC: 92 MG/DL
PSA SERPL-MCNC: 8.37 NG/ML
TRIGL SERPL-MCNC: 111 MG/DL

## 2021-11-05 ENCOUNTER — COMMUNICATION - RIVER FALLS (OUTPATIENT)
Dept: FAMILY MEDICINE | Facility: CLINIC | Age: 71
End: 2021-11-05

## 2021-11-11 ENCOUNTER — AMBULATORY - RIVER FALLS (OUTPATIENT)
Dept: FAMILY MEDICINE | Facility: CLINIC | Age: 71
End: 2021-11-11

## 2021-11-11 LAB
BASOPHILS # BLD MANUAL: 20 10*3/UL (ref 0–200)
BASOPHILS NFR BLD MANUAL: 0.6 %
EOSINOPHIL # BLD MANUAL: 20 10*3/UL (ref 15–500)
EOSINOPHIL NFR BLD MANUAL: 0.6 %
ERYTHROCYTE [DISTWIDTH] IN BLOOD BY AUTOMATED COUNT: 13.5 % (ref 11–15)
HCT VFR BLD AUTO: 42.2 % (ref 38.5–50)
HGB BLD-MCNC: 14.4 GM/DL (ref 13.2–17.1)
LYMPHOCYTES # BLD MANUAL: 1496 10*3/UL (ref 850–3900)
LYMPHOCYTES NFR BLD MANUAL: 44 %
MCH RBC QN AUTO: 33.5 PG (ref 27–33)
MCHC RBC AUTO-ENTMCNC: 34.1 GM/DL (ref 32–36)
MCV RBC AUTO: 98.1 FL (ref 80–100)
MONOCYTES # BLD MANUAL: 707 10*3/UL (ref 200–950)
MONOCYTES NFR BLD MANUAL: 20.8 %
NEUTROPHILS # BLD MANUAL: 1156 10*3/UL (ref 1500–7800)
NEUTROPHILS NFR BLD MANUAL: 34 %
PLATELET # BLD AUTO: 161 10*3/UL (ref 140–400)
PMV BLD: 10.4 FL (ref 7.5–12.5)
RBC # BLD AUTO: 4.3 10*6/UL (ref 4.2–5.8)
WBC # BLD AUTO: 3.4 10*3/UL (ref 3.8–10.8)

## 2022-01-19 ENCOUNTER — AMBULATORY - RIVER FALLS (OUTPATIENT)
Dept: FAMILY MEDICINE | Facility: CLINIC | Age: 72
End: 2022-01-19

## 2022-01-20 ENCOUNTER — COMMUNICATION - RIVER FALLS (OUTPATIENT)
Dept: FAMILY MEDICINE | Facility: CLINIC | Age: 72
End: 2022-01-20

## 2022-02-11 VITALS
BODY MASS INDEX: 31.23 KG/M2 | HEIGHT: 73 IN | DIASTOLIC BLOOD PRESSURE: 70 MMHG | WEIGHT: 235.6 LBS | TEMPERATURE: 98.5 F | SYSTOLIC BLOOD PRESSURE: 120 MMHG | HEART RATE: 76 BPM

## 2022-02-11 VITALS
HEART RATE: 88 BPM | SYSTOLIC BLOOD PRESSURE: 110 MMHG | TEMPERATURE: 97.3 F | DIASTOLIC BLOOD PRESSURE: 72 MMHG | TEMPERATURE: 97.6 F | HEIGHT: 73 IN | WEIGHT: 214.6 LBS | DIASTOLIC BLOOD PRESSURE: 70 MMHG | SYSTOLIC BLOOD PRESSURE: 110 MMHG | BODY MASS INDEX: 28.44 KG/M2 | WEIGHT: 220.4 LBS | HEART RATE: 80 BPM | HEIGHT: 73 IN | BODY MASS INDEX: 29.21 KG/M2

## 2022-02-11 VITALS
HEIGHT: 73 IN | BODY MASS INDEX: 29.37 KG/M2 | DIASTOLIC BLOOD PRESSURE: 76 MMHG | TEMPERATURE: 96.2 F | HEART RATE: 64 BPM | WEIGHT: 221.6 LBS | SYSTOLIC BLOOD PRESSURE: 124 MMHG

## 2022-02-11 VITALS
WEIGHT: 221.4 LBS | HEIGHT: 73 IN | DIASTOLIC BLOOD PRESSURE: 68 MMHG | BODY MASS INDEX: 29.34 KG/M2 | SYSTOLIC BLOOD PRESSURE: 129 MMHG | HEART RATE: 77 BPM

## 2022-02-11 VITALS
DIASTOLIC BLOOD PRESSURE: 84 MMHG | SYSTOLIC BLOOD PRESSURE: 124 MMHG | BODY MASS INDEX: 31.16 KG/M2 | HEART RATE: 80 BPM | HEIGHT: 73 IN | TEMPERATURE: 98 F

## 2022-02-11 VITALS
SYSTOLIC BLOOD PRESSURE: 123 MMHG | HEART RATE: 72 BPM | DIASTOLIC BLOOD PRESSURE: 68 MMHG | OXYGEN SATURATION: 96 % | HEIGHT: 73 IN | BODY MASS INDEX: 29.66 KG/M2 | HEART RATE: 75 BPM | DIASTOLIC BLOOD PRESSURE: 76 MMHG | SYSTOLIC BLOOD PRESSURE: 136 MMHG | HEIGHT: 73 IN | BODY MASS INDEX: 29.29 KG/M2 | WEIGHT: 223.8 LBS

## 2022-02-11 VITALS
SYSTOLIC BLOOD PRESSURE: 118 MMHG | DIASTOLIC BLOOD PRESSURE: 64 MMHG | SYSTOLIC BLOOD PRESSURE: 122 MMHG | HEIGHT: 73 IN | RESPIRATION RATE: 16 BRPM | OXYGEN SATURATION: 95 % | TEMPERATURE: 98.9 F | HEART RATE: 84 BPM | SYSTOLIC BLOOD PRESSURE: 122 MMHG | WEIGHT: 224 LBS | BODY MASS INDEX: 29.55 KG/M2 | HEART RATE: 72 BPM | DIASTOLIC BLOOD PRESSURE: 75 MMHG | HEART RATE: 69 BPM | DIASTOLIC BLOOD PRESSURE: 72 MMHG | BODY MASS INDEX: 29.42 KG/M2 | WEIGHT: 222 LBS

## 2022-02-11 VITALS
SYSTOLIC BLOOD PRESSURE: 130 MMHG | DIASTOLIC BLOOD PRESSURE: 82 MMHG | TEMPERATURE: 97.6 F | HEART RATE: 80 BPM | HEIGHT: 73 IN | HEART RATE: 73 BPM | TEMPERATURE: 95.9 F | WEIGHT: 217 LBS | DIASTOLIC BLOOD PRESSURE: 80 MMHG | BODY MASS INDEX: 28.76 KG/M2 | SYSTOLIC BLOOD PRESSURE: 122 MMHG | OXYGEN SATURATION: 99 %

## 2022-02-11 VITALS
HEART RATE: 79 BPM | BODY MASS INDEX: 29.93 KG/M2 | WEIGHT: 225.8 LBS | HEIGHT: 73 IN | OXYGEN SATURATION: 100 % | DIASTOLIC BLOOD PRESSURE: 76 MMHG | SYSTOLIC BLOOD PRESSURE: 130 MMHG

## 2022-02-15 NOTE — PROGRESS NOTES
Patient:   BIBI SHAW            MRN: [id number]            FIN: [id number]               Age:   70 years     Sex:  Male     :  1950   Associated Diagnoses:   Lumbar disc herniation   Author:   Bam Arnold PA-C      Visit Information      Date of Service: 2020 08:10 am  Performing Location: [facility]  Encounter#: [id number]      Primary Care Provider (PCP):  Bam Arnold PA-C    NPI# [id number]      Referring Provider:  Bam Arnold PA-C    NPI# [id number]   Visit type:  Telephone Encounter.    Source of history:  Patient.    Location of patient:  Home  Call Start Time:   900  Call End Time:   910      Chief Complaint   FU back pain      History of Present Illness   Today's visit was conducted via telephone due to the COVID-19 pandemic. Patient's consent to telephone visit was obtained and documented.      Reason for visit:  See prior PN. Back pain persists. Hip pain. Prior back surgery. Spasm intense. Did discuss advanced imaging. No bowel or bladder complaints. No fever or chills.      Review of Systems   Constitutional:  Negative.    Musculoskeletal:  Negative except as documented in history of present illness.    Integumentary:  Negative.    Neurologic:  Negative.       Impression and Plan   Diagnosis     Lumbar disc herniation (PQV00-UI M51.26).     Course:  Worsening.    Patient Instructions:       Counseled: Patient, Regarding diagnosis, Regarding treatment, Regarding medications.    Orders     Orders (Selected)   Outpatient Orders  Ordered  MRI Lumbar Spine w/o Contrast (Request): Priority: Routine, Instructions: CDI, Lumbar disc herniation  Prescriptions  Prescribed  Percocet 5 mg-325 mg oral tablet: 1 tab(s), Oral, q6 hrs, x 7 day(s), # 28 tab(s), 0 Refill(s), Type: Acute, Pharmacy: [facility] #[id number], 1 tab(s) Oral q6 hrs,x7 day(s), 73, in, 19 10:57:00 CDT, Height Measured, Weight Measured  cyclobenzaprine 10 mg oral tablet: = 1 tab(s) ( 10 mg ), Oral,  tid, PRN: for spasm, # 30 tab(s), 0 Refill(s), Type: Acute, Pharmacy: [facility] #[id number], 1 tab(s) Oral tid,PRN:for spasm, 73, in, 09/30/19 10:57:00 CDT, Height Measured, 224, lb, 06/16/20 11:33:00 CDT, Weight Juliana....     Discussed further steroids, he declines for now. FU after study or if any red flag symptoms.      Health Status   Allergies:    Allergic Reactions (Selected)  No Known Medication Allergies   Medications:  (Selected)   Prescriptions  Prescribed  Lipitor 40 mg oral tablet: = 1 tab(s) ( 40 mg ), PO, Daily, # 90 tab(s), 3 Refill(s), Type: Maintenance, Pharmacy: [facility], 1 tab(s) Oral daily  Percocet 5 mg-325 mg oral tablet: 1 tab(s), Oral, q6 hrs, x 7 day(s), # 28 tab(s), 0 Refill(s), Type: Acute, Pharmacy: [facility] #[id number], 1 tab(s) Oral q6 hrs,x7 day(s), 73, in, 09/30/19 10:57:00 CDT, Height Measured, Weight Measured  cyclobenzaprine 10 mg oral tablet: = 1 tab(s) ( 10 mg ), Oral, tid, PRN: for spasm, # 30 tab(s), 0 Refill(s), Type: Acute, Pharmacy: [facility] #[id number], 1 tab(s) Oral tid,PRN:for spasm, 73, in, 09/30/19 10:57:00 CDT, Height Measured, 224, lb, 06/16/20 11:33:00 CDT, Weight Juliana...  finasteride 5 mg oral tablet: = 1 tab(s) ( 5 mg ), PO, Daily, # 90 tab(s), 3 Refill(s), Type: Maintenance, Pharmacy: [facility], 1 tab(s) Oral daily  predniSONE 20 mg oral tablet: = 2 tab(s) ( 40 mg ), Oral, daily, x 5 day(s), # 10 tab(s), 0 Refill(s), Type: Acute, Pharmacy: [facility] #[id number], 2 tab(s) Oral daily,x5 day(s), 73, in, 09/30/19 10:57:00 CDT, Height Measured, 224, lb, 06/16/20 11:33:00 CDT, Weight Measured...  Documented Medications  Documented  Fish Oil: Oral, 0 Refill(s), Type: Maintenance  Multi Vitamin+: 0 Refill(s), Type: Maintenance  Vitamin B-12: 0 Refill(s), Type: Maintenance  Vitamin C: daily, 0 Refill(s), Type: Maintenance  acyclovir 800 mg oral tablet: = 1 tab(s) ( 800 mg ), Oral, daily, 0 Refill(s), Type: Maintenance  calcium  (as carbonate)-vitamin D 600 mg-125 intl units oral tablet: 1 tab(s), Oral, tid, # 270 tab(s), 0 Refill(s), Type: Maintenance  cayenne: cayenne, 0 Refill(s), Type: Maintenance  cycloSPORINE 0.1% ophthalmic emulsion: 0 Refill(s), Type: Maintenance  echinacea: 0 Refill(s), Type: Maintenance  prednisoLONE acetate 1% ophthalmic suspension: 0 Refill(s), Type: Soft Stop   Problem list:    All Problems  Obese / ICD-9-.00 / Probable  Hyperlipidemia NOS / ICD-9-.4 / Confirmed  Fuchs' corneal dystrophy / SNOMED CT 841807031 / Confirmed  Ed (Erectile Dysfunction) / ICD-9-.84 / Confirmed  Common Peroneal Nerve Dysfunction / SNOMED CT 209667999 / Confirmed  CLL (chronic lymphocytic leukemia) / SNOMED CT 162536648 / Confirmed  BPH (Benign Prostatic Hypertrophy) / ICD-9-.00 / Confirmed  Resolved: Lumbar disc herniation / SNOMED CT 537158127  Resolved: Amputation of finger of left hand / SNOMED CT 176019221      Histories   Past Medical History:    Active  Hyperlipidemia NOS (272.4)  Ed (Erectile Dysfunction) (607.84)  BPH (Benign Prostatic Hypertrophy) (600.00)  Common Peroneal Nerve Dysfunction (887905528)  Obese (278.00)  CLL (chronic lymphocytic leukemia) (298970557)  Fuchs' corneal dystrophy (910155268)  Resolved  Lumbar disc herniation (754439683): Onset in  at 53 years.  Resolved.  Comments:  2015 CDT 12:11 PM CDT - Ama Gallo  Left L5-S1 with S1 radiculopathy.  Amputation of finger of left hand (743875463): Onset in  at 27 years.  Resolved.  Comments:  2015 CDT 12:15 PM CDT - Ama Gallo  Index fiinger.   Family History:    Diabetes mellitus  Son (Reji)  Hypertension  Father ()  Heart disease  Grandmother (M) (General Family Hx)  Diabetes mellitus type I  Daughter (Carly)  Son (Arnie)  Allergic rhinitis  Father ()  CA - Cancer  Brother  Comments:  2013 2:17 PM CDT - Mya Lopez  Recurring lymphoma  Arthritis  Brother  Mother  Stroke  Grandmother (M) (General  Family Hx)  Hypercholesterolemia  Mother  Brother  Brother  Father ()  Alzheimer's disease  Mother  Cardiovascular Disease  Mother     Procedure history:    Corneal transplant (602372697) in the month of 2018 at 68 Years.  Comments:  2019 9:35 AM CST - Hao SAMMJessica  Right Eye  Microdiscectomy (360546951) on 2003 at 53 Years.  Comments:  2010 4:07 PM CST - Fauzia Nela  L5-S1  Colonoscopy (605169744) in  at 50 Years.   Social History:        Alcohol Assessment: Current            1-2 times per week, 1 drinks/episode average.  Ready to change: No.                     Comments:                      2010 - Miguelina AZULN, Melanie                     occ, very little      Tobacco Assessment: Denies Tobacco Use            Never      Substance Abuse Assessment: Denies Substance Abuse            Never      Employment and Education Assessment            Retired      Home and Environment Assessment            Marital status: .  Spouse/Partner name: Shaila.  Living situation: Home/Independent.               Injuries/Abuse/Neglect in household: No.  Feels unsafe at home: No.  Family/Friends available for support:               Yes.      Nutrition and Health Assessment            Type of diet: Regular.  Wants to lose weight: No.  Sleeping concerns: No.  Feels highly stressed: No.      Exercise and Physical Activity Assessment: Regular exercise            Exercise frequency: 5-6 times/week.  Exercise type: Walking.      Sexual Assessment            Sexually active: Yes.  Identifies as male, Sexual orientation: Straight or heterosexual.  History of STD: No.               Contraceptive Use Details: None.  History of sexual abuse: No.

## 2022-02-15 NOTE — LETTER
(Inserted Image. Unable to display)   [address] [place]  [address]  [phone]    September 16, 2020      BIBI SHAW      [address], [place] [id number]        Dear BIBI,    Thank you for selecting [facility] for your healthcare needs. Below you will find the result of your recent test(s) done at our clinic.     Your glucose is borderline. Your WBC are elevated, follow with oncology as scheduled. The rest of your chemistries are fine. Your PSA is borderline. We can discuss at your next visit.      Result Name Current Result Previous Result Reference Range   Sodium Level (mmol/L)  140 9/15/2020  141 6/16/2020 135 - 146   Potassium Level (mmol/L)  3.9 9/15/2020  4.2 6/16/2020 3.5 - 5.3   Chloride Level (mmol/L)  106 9/15/2020  107 6/16/2020 98 - 110   CO2 Level (mmol/L)  24 9/15/2020  26 6/16/2020 20 - 32   Glucose Level (mg/dL) ((H)) 101 9/15/2020  98 6/16/2020 65 - 99   BUN (mg/dL)  20 9/15/2020  25 6/16/2020 7 - 25   Creatinine Level (mg/dL)  1.01 9/15/2020  0.97 6/16/2020 0.70 - 1.25   Calcium Level (mg/dL)  9.4 9/15/2020  9.8 6/16/2020 8.6 - 10.3   Bilirubin Total (mg/dL)  0.6 9/15/2020  0.7 6/16/2020 0.2 - 1.2   Alkaline Phosphatase (unit/L) ((H)) 150 9/15/2020  120 6/16/2020 35 - 144   AST/SGOT (unit/L)  22 9/15/2020  23 6/16/2020 10 - 35   ALT/SGPT (unit/L)  20 9/15/2020  28 6/16/2020 9 - 46   LDH (unit/L)  123 9/15/2020  132 6/16/2020 120 - 250   Protein Total (gm/dL)  6.6 9/15/2020  6.5 6/16/2020 6.1 - 8.1   Albumin Level (gm/dL)  4.6 9/15/2020  4.6 6/16/2020 3.6 - 5.1   Globulin  2.0 9/15/2020  1.9 6/16/2020 1.9 - 3.7   A/G Ratio  2.3 9/15/2020  2.4 6/16/2020 1.0 - 2.5   Cholesterol (mg/dL)  153 9/15/2020  167 9/5/2019  - <200   Non-HDL Cholesterol  116 9/15/2020  123 9/5/2019  - <130   HDL (mg/dL) ((L)) 37 9/15/2020  44 9/5/2019 > OR = 40 -    Cholesterol/HDL Ratio  4.1 9/15/2020  3.8 9/5/2019  - <5.0   LDL  89 9/15/2020  97 9/5/2019    Triglyceride (mg/dL) ((H))  178 9/15/2020 ((H)) 158 9/5/2019  - <150   PSA (ng/mL) ((H)) 4.4 9/15/2020  3.2 9/30/2019  - < OR = 4.0   WBC ((H)) 128.1 9/15/2020 ((H)) 95.3 6/16/2020 3.8 - 10.8   RBC ((L)) 3.85 9/15/2020 ((L)) 3.91 6/16/2020 4.20 - 5.80   Hgb (gm/dL) ((L)) 12.0 9/15/2020 ((L)) 12.7 6/16/2020 13.2 - 17.1   Hct (%) ((L)) 38.0 9/15/2020 ((L)) 37.9 6/16/2020 38.5 - 50.0   MCV (fL)  98.7 9/15/2020  96.9 6/16/2020 80.0 - 100.0   MCH (pg)  31.2 9/15/2020  32.5 6/16/2020 27.0 - 33.0   MCHC (gm/dL) ((L)) 31.6 9/15/2020  33.5 6/16/2020 32.0 - 36.0   RDW (%)  13.8 9/15/2020  12.7 6/16/2020 11.0 - 15.0   Platelet  165 9/15/2020  157 6/16/2020 140 - 400   MPV (fL)  11.5 9/15/2020  11.2 6/16/2020 7.5 - 12.5   Lymphocytes (%)  91.1 9/15/2020  89.0 6/16/2020    Abs Lymphocytes ((H)) 116,699 9/15/2020 ((H)) 84,817 6/16/2020 850 - 3,900   Neutrophils (%)  2.6 9/15/2020  7.0 6/16/2020    Abs Neutrophils  3,331 9/15/2020  6,671 6/16/2020 1,500 - 7,800   Monocytes (%)  5.3 9/15/2020  4.0 6/16/2020    Abs Monocytes ((H)) 6,789 9/15/2020 ((H)) 3,812 6/16/2020 200 - 950   Eosinophils (%)  0.3 9/15/2020  0 6/16/2020    Abs Eosinophils  384 9/15/2020 ((L)) 0 6/16/2020 15 - 500   Basophils (%)  0.7 9/15/2020  0 6/16/2020    Abs Basophils ((H)) 897 9/15/2020  0 6/16/2020 0 - 200   Differential Comment  Few atypical lymphocytes noted Smudge cells present. 9/15/2020  See comment 6/16/2020        Please contact my practice at [phone] if you have any questions or concerns.      Sincerely,        Andres Medrano MD ,   Nela Mcarthur MD,   Bam Arnold PA-C

## 2022-02-15 NOTE — TELEPHONE ENCOUNTER
---------------------  From: Jacquelyn Alejo CMA (Phone Messages Pool ([id number]_[facility]))   To: BIBI SHAW    Sent: 12/17/2020 11:14:04 AM CST  Subject: RE: Covid vaccine     Shankar Deutsch,    We do not have a date set on vaccine arrival. The best way to keep track is to sign up for our newsletter and you will get updates via email on that information once it's available.     [email or web address]    Jacquelyn Vang CMA      ---------------------  From: BIBI SHAW  To: [facility]  Sent: 12/17/2020 11:08 a.m. CST  Subject: Covid vaccine  when do you expect to have the covid vaccine available for cancer patients like myself and other high risk patients?

## 2022-02-15 NOTE — PROGRESS NOTES
Patient:   BIBI SHAW            MRN: [id number]            FIN: [id number]               Age:   68 years     Sex:  Male     :  1950   Associated Diagnoses:   Encounter for Medicare annual wellness exam; Hyperlipidemia NOS; BPH (Benign Prostatic Hypertrophy); Claudication; Immunization due; Lumbar disc herniation; Common Peroneal Nerve Dysfunction; Neck mass   Author:   Bam Arnold PA-C      Visit Information      Date of Service: 2018 10:38 am  Performing Location: [facility]  Encounter#: [id number]      Primary Care Provider (PCP):  Bam Arnold PA-C    NPI# [id number]      Referring Provider:  Bam Arnold PA-C    NPI# [id number]   Visit type:  Annual exam.    Accompanied by:  No one.    Source of history:  Self, Medical record.    History limitation:  None.       Chief Complaint   2018 10:44 AM CDT    AWV, fasting for bloodwork   Medicare Initial / Annual Preventative Visit      Well Adult History   Well Adult History             The patient presents for well adult exam.  ADL's and Safety were reviewed.  _No concerns _____ found.  Please see copy of scanned form.    I have reviewed with the patient the completed health risk assessment and health history form and we have agreed on the following plans for identified risk factors. __no concerns_____ found.  Please see copy of scanned form.   .  Swelling right side of neck. Life screening noted. Abnormal ROSEMARIE.        Review of Systems   Eye:  See Preventative Services Checklist for Vision/Glaucoma Test dates..    Ear/Nose/Mouth/Throat:  Hearing is evaluated..    Respiratory:  Negative.    Cardiovascular:  Negative.    Gastrointestinal:  Negative.    Genitourinary:  Nocturia stable..    Hematology/Lymphatics:  Negative.    Endocrine:  Negative.    Immunologic:  Negative.    Musculoskeletal:  Negative.    Integumentary:  Negative.    Psychiatric:  GDS  Noted.    See completed Health History for Review of Systems.      Health Status    Allergies:    Allergic Reactions (Selected)  Severity Not Documented  Egg Allergy (No reactions were documented)   Medications:  (Selected)   Prescriptions  Prescribed  Lipitor 40 mg oral tablet: 1 tab(s) ( 40 mg ), PO, Daily, # 90 tab(s), 3 Refill(s), Type: Maintenance, Pharmacy: [facility] #[id number], 1 tab(s) po daily  finasteride 5 mg oral tablet: 1 tab(s) ( 5 mg ), PO, Daily, # 90 tab(s), 3 Refill(s), Type: Maintenance, Pharmacy: [facility] #[id number], 1 tab(s) po daily   Problem list:    All Problems  BPH (Benign Prostatic Hypertrophy) / ICD-9-.00 / Confirmed  Common Peroneal Nerve Dysfunction / SNOMED CT 878945441 / Confirmed  Ed (Erectile Dysfunction) / ICD-9-.84 / Confirmed  Hyperlipidemia NOS / ICD-9-.4 / Confirmed  Obese / ICD-9-.00 / Probable  Resolved: Amputation of finger of left hand / SNOMED CT 691878762  Resolved: Lumbar disc herniation / SNOMED CT 359469444     Current Providers and Suppliers Noted in Demographic Area.      Histories   Past Medical History:    Active  Hyperlipidemia NOS (272.4)  Ed (Erectile Dysfunction) (607.84)  BPH (Benign Prostatic Hypertrophy) (600.00)  Common Peroneal Nerve Dysfunction (051870222)  Obese (278.00)  Resolved  Lumbar disc herniation (468653981): Onset in 2003 at 53 years.  Resolved.  Comments:  9/24/2015 CDT 12:11 PM CDT - Ama Gallo  Left L5-S1 with S1 radiculopathy.  Amputation of finger of left hand (561834274): Onset in 1977 at 27 years.  Resolved.  Comments:  9/24/2015 CDT 12:15 PM CDT - Ama Gallo  Index fiinger.   Family History:    Heart disease  Grandmother (M) (General Family Hx)  Diabetes mellitus type I  Daughter (Carly)  Son (Arnie)  CA - Cancer  Brother  Comments:  9/19/2013 2:17 PM - Mya Lopez  Recurring lymphoma  Arthritis  Brother  Stroke  Grandmother (M) (General Family Hx)  Hypercholesterolemia  Mother  Brother  Brother  Cardiovascular Disease  Mother     Procedure history:    Microdiscectomy (590543987) on  11/12/2003 at 53 Years.  Comments:  11/30/2010 4:07 PM - Nela Cage  L5-S1  Colonoscopy (621352976) in 2000 at 50 Years.   Social History:        Alcohol Assessment: Past            Past                     Comments:                      09/03/2010 - Miguelina LOZANO, Melanie                     occ, very little      Tobacco Assessment: Denies Tobacco Use            Never      Substance Abuse Assessment: Denies Substance Abuse            Never      Employment and Education Assessment                  Home and Environment Assessment            Marital status: .  Spouse/Partner name: Shaila.      Nutrition and Health Assessment            Type of diet: Regular.      Exercise and Physical Activity Assessment: Does not exercise            Exercise frequency: Never.  Exercise type: Walking.      Sexual Assessment            Sexually active: Yes.  Sexual orientation: Straight or heterosexual.        Physical Examination   Exam at Provider Discretion   Vital Signs   9/5/2018 10:44 AM CDT Temperature Tympanic 97.6 DegF  LOW    Peripheral Pulse Rate 80 bpm    Pulse Site Radial artery    HR Method Manual    Systolic Blood Pressure 110 mmHg    Diastolic Blood Pressure 72 mmHg    Mean Arterial Pressure 85 mmHg    BP Site Left arm    BP Method Manual      Measurements from flowsheet : Measurements   9/5/2018 10:44 AM CDT Height Measured - Standard 72.75 in    Weight Measured - Standard 220.4 lb    BSA 2.26 m2    Body Mass Index 29.28 kg/m2  HI      General:  Alert and oriented, No acute distress.    Eye:  Pupils are equal, round and reactive to light, Extraocular movements are intact, Normal conjunctiva.    HENT:  Normocephalic, Tympanic membranes are clear, Oral mucosa is moist, No pharyngeal erythema.    Neck:  Supple, No thyromegaly.    Respiratory:  Lungs are clear to auscultation, Respirations are non-labored.    Cardiovascular:  Normal rate, Regular rhythm, No murmur.    Gastrointestinal:   Soft, Non-tender, Non-distended, Normal bowel sounds, No organomegaly.    Genitourinary:  No costovertebral angle tenderness.    Musculoskeletal:  Left foot drop, chronic and stable..    Neurologic:  No signs of cognitive impairment..    Psychiatric:  Patient's GDS and CAGE questionnaire reviewed and discussed with patient..       Health Maintenance      Recommendations     Pending (in the next year)        OverDue           Colorectal Cancer Screen (Colonoscopy) (Male) due  05/28/10  and every 10  year(s)           Colorectal Cancer Screen (Occult Blood) (Male) due  05/28/10  and every 10  year(s)           Colorectal Cancer Screen (Sigmoidoscopy) (Male) due  05/28/10  and every 10  year(s)           Tetanus Vaccine due  06/06/15  and every 10  year(s)        Due            Abdominal Aortic Aneurysm Screen (if hx of smoking) due  09/05/18  One-time only           Aspirin Therapy for Prevention of CVD (Male) due  09/05/18  and every 5  year(s)           Fall Risk Screen (Male) due  09/05/18  and every 1  year(s)           HIV Screen (if sexually active) (Male) due  09/05/18  and every 1  year(s)           Hepatitis C Screen 2115-2680 (Male) due  09/05/18  One-time only           Lung Cancer Screen (Male) due  09/05/18  and every 1  year(s)           STD Counseling (if sexually active) (Male) due  09/05/18  and every 1  year(s)           Syphilis Screen (if sexually active) (Male) due  09/05/18  and every 1  year(s)        Near Due            Body Mass Index Check (Male) near due  09/13/18  and every 1  year(s)           High Blood Pressure Screen (Male) near due  09/13/18  and every 1  year(s)           Obesity Screen and Counseling (Male) near due  09/13/18  and every 1  year(s)           Tobacco Use Screen (Male) near due  09/13/18  and every 1  year(s)           Influenza Vaccine near due  09/13/18  and every 1  year(s)           Lipid Disorders Screen (Male) near due  09/13/18  and every 1  year(s)            Alcohol Misuse Screen (Male) near due  09/13/18  and every 1  year(s)           Depression Screen (Male) near due  09/13/18  and every 1  year(s)     Satisfied (in the past 1 year)        Satisfied            Alcohol Misuse Screen (Male) on  09/13/17.           Body Mass Index Check (Male) on  09/13/17.           Depression Screen (Male) on  09/13/17.           Depression Screen (Male) on  09/13/17.           Depression Screen (Male) on  09/13/17.           High Blood Pressure Screen (Male) on  09/13/17.           Influenza Vaccine on  09/13/17.           Lipid Disorders Screen (Male) on  09/13/17.           Lipid Disorders Screen (Male) on  09/13/17.           Lipid Disorders Screen (Male) on  09/13/17.           Lipid Disorders Screen (Male) on  09/13/17.           Obesity Screen and Counseling (Male) on  09/13/17.           Pneumococcal Vaccine on  09/13/17.           Tobacco Use Screen (Male) on  09/13/17.        Review / Management   Results review:  INCLUDE PHQ 9.       Impression and Plan   Diagnosis     Encounter for Medicare annual wellness exam (SMT28-LK Z00.00).     Hyperlipidemia NOS (YAQ74-UN E78.5).     BPH (Benign Prostatic Hypertrophy) (AOW14-KE N40.0).     Claudication (QNS11-PK I73.9).     Immunization due (FUC73-MS Z23).     Lumbar disc herniation (GEK92-MQ M51.26).     Common Peroneal Nerve Dysfunction (VJN63-JS S84.12XA).     Neck mass (YSF30-CO R22.1).     Course:  Progressing as expected.    Plan:  Please list plan for positive findings, treatment options, risk vs. benefits..    Patient Instructions:       Counseled: Patient, Discussed preventative services including  Screening for AAA (Family history or male 65-70 who has smoked more than 100 cigarettes in a lifetime.), Lipid screening, Diabetes screening, Nutrition, Bone mass, Cancer screening (cervical, breast, colon), Immunizations (flu, Pneumovax, Hep. B, Zostavax), Glaucoma screening and ECG if needed.  Appropriate weight and diet  discussed.  Discussed Advance Life Directives.    Preventative services checklist reviewed, updated and copy given to patient.  Please see scanned document.       .    Orders     Orders (Selected)   Outpatient Orders  Ordered  US Artery Lower Extremity Bilateral (Request): Priority: Routine, Claudication  Neck mass  Hyperlipidemia NOS  US Head/Neck Soft Tissue (Request): Priority: Routine, Claudication  Neck mass  Hyperlipidemia NOS  Ordered (In Transit)  Basic Metabolic Panel* ([facility]): Specimen Type: Serum, Collection Date: 09/05/18 10:58:00 CDT  CBC (h/h, RBC, indices, WBC, Plt)* ([facility]): Specimen Type: Blood, Collection Date: 09/05/18 10:58:00 CDT  Lipid panel with reflex to direct ldl* ([facility]): Specimen Type: Serum, Collection Date: 09/05/18 10:58:00 CDT  PSA, Total (Room)* ([facility]): Specimen Type: Serum, Collection Date: 09/05/18 11:07:00 CDT  Completed  Pneumovax 23: 0.5 mL, IM, once.        Professional Services   Counseling Summary:  Total time spent with the patient and coordination of care:  20 minutes. Colonoscopy set up..

## 2022-02-15 NOTE — NURSING NOTE
Medicare Visit Entered On:  2021 11:03 AM CDT    Performed On:  2021 10:50 AM CDT by Radha Nj MA               Summary   Chief Complaint :   AWV   Advance Directive :   No   Weight Measured :   221.6 lb(Converted to: 221 lb 10 oz, 100.516 kg)    Height Measured :   73 in(Converted to: 6 ft 1 in, 185.42 cm)    Body Mass Index :   29.23 kg/m2 (HI)    Body Surface Area :   2.27 m2   Systolic Blood Pressure :   124 mmHg   Diastolic Blood Pressure :   76 mmHg   Mean Arterial Pressure :   92 mmHg   Peripheral Pulse Rate :   64 bpm   BP Site :   Right arm   Pulse Site :   Radial artery   BP Method :   Electronic   HR Method :   Electronic   Temperature Tympanic :   96.2 DegF(Converted to: 35.7 DegC)  (LOW)    Radha Nj MA - 2021 10:50 AM CDT   Health Status   Allergies Verified? :   Yes   Medication History Verified? :   Yes   Medical History Verified? :   Yes   Pre-Visit Planning Status :   Completed   Tobacco Use? :   Never smoker   Radha Nj MA - 2021 10:50 AM CDT   Demographics   Last Name :   Gary   Address :   [address] [place] [address]   First Name :   Get Sprague Initial :   J   Responsible Party Date of Birth () :   1950 CDT   City :   [place]   State :   [place]   Zip Code :   [address]   Contact Relationship to Patient (other) :   Patient   Clem CELIS, Radha - 2021 10:50 AM CDT   Providers Grid   Provider Name :    [facility]           Provider Specialty :    Optic   Dentist             Radha Nj MA - 2021 10:50 AM CDT  Radha Nj MA - 2021 10:50 AM CDT        Ancillary Services Grid   Name :    [facility]              Type of Service :    Pharmacy                Radha Nj MA - 2021 10:50 AM CDT         Consents   Consent for Immunization Exchange :   Consent Granted   Consent for Immunizations to Providers :   Consent Granted   Radha Nj MA - 2021 10:50 AM CDT   Meds / Allergies   (As Of: 2021 11:03:50 AM CDT)    Allergies (Active)   No Known Medication Allergies  Estimated Onset Date:   Unspecified ; Created By:   Jessica Bui CMA; Reaction Status:   Active ; Category:   Drug ; Substance:   No Known Medication Allergies ; Type:   Allergy ; Updated By:   Jessica Bui CMA; Reviewed Date:   2/26/2021 8:11 AM CST        Medication List   (As Of: 11/1/2021 11:03:50 AM CDT)   Prescription/Discharge Order    atorvastatin  :   atorvastatin ; Status:   Prescribed ; Ordered As Mnemonic:   Lipitor 40 mg oral tablet ; Simple Display Line:   40 mg, 1 tab(s), PO, Daily, 90 tab(s), 3 Refill(s) ; Ordering Provider:   Bam Arnold PA-C; Catalog Code:   atorvastatin ; Order Dt/Tm:   10/29/2020 12:19:20 PM CDT          cyclobenzaprine  :   cyclobenzaprine ; Status:   Prescribed ; Ordered As Mnemonic:   cyclobenzaprine 10 mg oral tablet ; Simple Display Line:   10 mg, 1 tab(s), Oral, tid, PRN: for spasm, 30 tab(s), 0 Refill(s) ; Ordering Provider:   Bam Arnold PA-C; Catalog Code:   cyclobenzaprine ; Order Dt/Tm:   7/28/2020 1:07:28 PM CDT          finasteride  :   finasteride ; Status:   Prescribed ; Ordered As Mnemonic:   finasteride 5 mg oral tablet ; Simple Display Line:   5 mg, 1 tab(s), PO, Daily, 90 tab(s), 3 Refill(s) ; Ordering Provider:   Bam Arnold PA-C; Catalog Code:   finasteride ; Order Dt/Tm:   10/29/2020 12:19:19 PM CDT          predniSONE  :   predniSONE ; Status:   Completed ; Ordered As Mnemonic:   predniSONE 10 mg oral tablet ; Simple Display Line:   4 tabs daily x 4, then 3 tabs daily x 4, then 2 tabs daily x 4, then 1 tab daily x 4., Oral, daily, 40 tab(s), 0 Refill(s) ; Ordering Provider:   Bam Arnold PA-C; Catalog Code:   predniSONE ; Order Dt/Tm:   2/26/2021 8:06:58 AM CST          triamcinolone topical  :   triamcinolone topical ; Status:   Prescribed ; Ordered As Mnemonic:   triamcinolone 0.1% topical cream ; Simple Display Line:   See Instructions, APPLY TO AFFECTED AREA TWICE A DAY, 30 gm, 0  Refill(s) ; Ordering Provider:   Bam Arnold PA-C; Catalog Code:   triamcinolone topical ; Order Dt/Tm:   1/5/2021 8:53:55 AM CST            Home Meds    acyclovir  :   acyclovir ; Status:   Documented ; Ordered As Mnemonic:   acyclovir 800 mg oral tablet ; Simple Display Line:   800 mg, 1 tab(s), Oral, daily, 0 Refill(s) ; Catalog Code:   acyclovir ; Order Dt/Tm:   7/6/2020 8:36:33 AM CDT          ascorbic acid  :   ascorbic acid ; Status:   Documented ; Ordered As Mnemonic:   Vitamin C ; Simple Display Line:   daily, 0 Refill(s) ; Catalog Code:   ascorbic acid ; Order Dt/Tm:   9/30/2019 11:11:34 AM CDT          calcium-vitamin D  :   calcium-vitamin D ; Status:   Documented ; Ordered As Mnemonic:   calcium (as carbonate)-vitamin D 600 mg-125 intl units oral tablet ; Simple Display Line:   1 tab(s), Oral, tid, 270 tab(s), 0 Refill(s) ; Catalog Code:   calcium-vitamin D ; Order Dt/Tm:   9/30/2019 11:10:43 AM CDT          cyanocobalamin  :   cyanocobalamin ; Status:   Documented ; Ordered As Mnemonic:   Vitamin B-12 ; Simple Display Line:   0 Refill(s) ; Catalog Code:   cyanocobalamin ; Order Dt/Tm:   9/30/2019 11:11:28 AM CDT          cycloSPORINE ophthalmic  :   cycloSPORINE ophthalmic ; Status:   Documented ; Ordered As Mnemonic:   cycloSPORINE 0.1% ophthalmic emulsion ; Simple Display Line:   0 Refill(s) ; Catalog Code:   cycloSPORINE ophthalmic ; Order Dt/Tm:   9/30/2019 11:09:49 AM CDT          echinacea  :   echinacea ; Status:   Documented ; Ordered As Mnemonic:   echinacea ; Simple Display Line:   0 Refill(s) ; Catalog Code:   echinacea ; Order Dt/Tm:   9/30/2019 11:10:11 AM CDT          ferrous sulfate  :   ferrous sulfate ; Status:   Documented ; Ordered As Mnemonic:   ferrous sulfate ; Simple Display Line:   Oral, 0 Refill(s) ; Catalog Code:   ferrous sulfate ; Order Dt/Tm:   10/28/2020 2:01:47 PM CDT          lisinopril  :   lisinopril ; Status:   Documented ; Ordered As Mnemonic:   lisinopril 10 mg  oral tablet ; Simple Display Line:   90 EA, 0 Refill(s) ; Catalog Code:   lisinopril ; Order Dt/Tm:   10/28/2021 11:53:59 AM CDT ; Comment:   Responsible Provider: ANTONIO STEPHEN          Miscellaneous Prescription  :   Miscellaneous Prescription ; Status:   Documented ; Ordered As Mnemonic:   cayenne ; Simple Display Line:   0 Refill(s) ; Catalog Code:   Miscellaneous Prescription ; Order Dt/Tm:   9/30/2019 11:11:19 AM CDT          multivitamin  :   multivitamin ; Status:   Documented ; Ordered As Mnemonic:   Multi Vitamin+ ; Simple Display Line:   0 Refill(s) ; Catalog Code:   multivitamin ; Order Dt/Tm:   9/30/2019 11:10:02 AM CDT          omega-3 polyunsaturated fatty acids  :   omega-3 polyunsaturated fatty acids ; Status:   Documented ; Ordered As Mnemonic:   Fish Oil ; Simple Display Line:   Oral, 0 Refill(s) ; Catalog Code:   omega-3 polyunsaturated fatty acids ; Order Dt/Tm:   9/30/2019 11:11:05 AM CDT          prednisoLONE ophthalmic  :   prednisoLONE ophthalmic ; Status:   Documented ; Ordered As Mnemonic:   prednisoLONE acetate 1% ophthalmic suspension ; Simple Display Line:   0 Refill(s) ; Catalog Code:   prednisoLONE ophthalmic ; Order Dt/Tm:   2/4/2019 11:28:33 AM CST ; Comment:   Responsible Provider: ZACH KEENAN          prochlorperazine  :   prochlorperazine ; Status:   Documented ; Ordered As Mnemonic:   prochlorperazine 10 mg oral tablet ; Simple Display Line:   30 EA, 0 Refill(s) ; Catalog Code:   prochlorperazine ; Order Dt/Tm:   10/28/2021 11:53:59 AM CDT ; Comment:   Responsible Provider: BEHNKEN, AMY LYNN          venetoclax  :   venetoclax ; Status:   Documented ; Ordered As Mnemonic:   venetoclax 100 mg oral tablet ; Simple Display Line:   400 mg, 4 tab(s), Oral, daily, 0 Refill(s) ; Catalog Code:   venetoclax ; Order Dt/Tm:   11/1/2021 10:58:38 AM CDT            Social History   Social History   (As Of: 11/1/2021 11:03:50 AM CDT)   Alcohol:        Current, 1-2  times per month, 1 drinks/episode average.  Ready to change: No.   Comments:  9/3/2010 3:24 PM - Miguelina LOZANOMelanie: occ, very little   (Last Updated: 11/1/2021 11:01:01 AM CDT by Radha Nj MA)          Tobacco:  Denies Tobacco Use      Never   (Last Updated: 9/6/2012 8:06:39 AM CDT by Mya Lopez)   Never (less than 100 in lifetime)   (Last Updated: 2/26/2021 7:49:11 AM CST by Sunshine Harp CMA)          Electronic Cigarette/Vaping:        Electronic Cigarette Use: Never.   (Last Updated: 2/26/2021 7:49:16 AM CST by Sunshine Harp CMA)          Substance Abuse:  Denies Substance Abuse      Never   (Last Updated: 9/6/2012 8:06:45 AM CDT by Mya Lopez)          Employment/School:        Retired   (Last Updated: 9/13/2018 2:20:23 PM CDT by Mya Lopez)          Home/Environment:        Marital status: .  Spouse/Partner name: Shaila.  Living situation: Home/Independent.  Injuries/Abuse/Neglect in household: No.  Feels unsafe at home: No.  Family/Friends available for support: Yes.   (Last Updated: 4/9/2020 11:48:44 AM CDT by Mya Lopez)          Nutrition/Health:        Type of diet: Regular.  Wants to lose weight: Yes.  Sleeping concerns: No.  Feels highly stressed: Yes.   (Last Updated: 11/10/2020 10:45:21 AM CST by Mya Lopez)          Exercise:  Occasional exercise      Exercise frequency: Daily.  Exercise type: Walking.   (Last Updated: 11/1/2021 11:01:16 AM CDT by Radha Nj MA)          Sexual:        Sexually active: Yes.  Identifies as male, Sexual orientation: Straight or heterosexual.  History of STD: No.  Contraceptive Use Details: None.  History of sexual abuse: No.   (Last Updated: 4/9/2020 11:49:12 AM CDT by Mya Lopez)            Geriatric Depression Screening   Geriatric Depression Satisfied Life :   Yes   Geriatric Depression Dropped Activities :   No   Geriatric Depression Life Empty :   No   Geriatric Depression Bored :   No   Geriatric Depression Good Spirits :    Yes   Geriatric Depression Afraid Bad Things :   Yes   Geriatric Depression Feel Happy :   Yes   Geriatric Depression Feel Helpless :   No   Geriatric Depression Prefer to Stay Home :   No   Geriatric Depression Memory Problems :   No   Geriatric Depression Wonderful Be Alive :   Yes   Geriatric Depression Feel Worthless :   No   Geriatric Depression Situation Hopeless :   No   Geriatric Depression Others Better Off :   No   Geriatric Depression Full of Energy :   No   Geriatric Depression Total Score :   2    Clem CELIS Radha - 11/1/2021 10:50 AM CDT   Hearing and Vision Screening   Tympanogram Performed Left Ear :   Normal   Audiogram Result Right Ear :   Fail   Audiogram Result Left Ear :   Fail   Hearing Screen Comments :   failed right ear at 4000Hz; left ear at 2000 and 4000Hz   Clem CELIS Radha - 11/1/2021 10:50 AM CDT   Home Safety Screen   Emergency Numbers Kept/Updated :   Yes   Aware of Smoking Dangers :   Yes   Smoke Alarms/Fire Extinguisher Available :   Yes   Household Members Fire Safety Knowledge :   Yes   Floor Rugs Removed or Fastened :   No   Mats in Bathtub/Shower :   No   Stairway Rails or Banisters :   Yes   Outdoor Clutter Safety :   Yes   Indoor Clutter Safety :   No   Electrical Cord Safety :   Yes   Clem CELIS Radha - 11/1/2021 10:50 AM CDT   Advance Directives   Advance Directive :   No   [facility]lem [facility]ELIS [facility] 11/1/2021 10:50 AM CDT   Flores Fall Risk   History of Fall in Last 3 Months Flores :   No   Clem CELIS [facility] - 11/1/2021 10:50 AM CDT   Functional Assessment   Focused Functional Assessment Grid   Bathing :   Independent (2)   Dressing :   Independent (2)   Toileting :   Independent (2)   Transferring Bed or Chair :   Independent (2)   Feeding :   Independent (2)   Clem CELIS [facility] - 11/1/2021 10:50 AM CDT   Capable of Shopping :   Yes   Capable of Walking :   Yes   Capable of Housekeeping :   Yes   Capable of Managing Medications :   Yes   Capable of Handling Finances :    Yes   Clem CELIS, Radha - 11/1/2021 10:50 AM CDT

## 2022-02-15 NOTE — TELEPHONE ENCOUNTER
---------------------  From: Gayathri Solares MA   To: BIBI SHAW    Sent: 10/9/2019 1:37:10 PM CDT  Subject: RE: Appointment Request     Shankar Deutsch,     We do not have an order for a provider by that name. We are able to do the blood draw if you can get us the order. The only order we have on file for you it from Justine Tam at the [facility] in [place].     Thank you,  Dario      ---------------------  From: Tarah Carvajal (Appointment Pool ([id number]))   To: Gayathri Solares MA;     Sent: 10/9/2019 1:32:20 PM CDT  Subject: FW: Appointment Request           ---------------------  From: BIBI SHAW  To: [facility]  Sent: 10/09/2019 11:03 a.m. CDT  Subject: Appointment Request  Patient Name: BIBI SHAW  Patient : 1950  Appointment Dates: Between 2019 and 2019  Preferred Days: Wednesday  Preferred Time: 11AM  Contact Patient by: Secure Message    Reason for Visit:    Blood test as ordered by Dr Butler

## 2022-02-15 NOTE — PROGRESS NOTES
Patient:   BIBI SHAW            MRN: [id number]            FIN: [id number]               Age:   66 years     Sex:  Male     :  1950   Associated Diagnoses:   Left foot pain; Common Peroneal Nerve Dysfunction   Author:   Bam Arnold PA-C      Visit Information   Visit type:  General concerns.    Accompanied by:  No one.    Source of history:  Self, Medical record.    Referral source:  Self.    History limitation:  None.       Chief Complaint   Left ankle.foot pain. CC above noted and confirmed with the patient. No injury. Presumed gout in the fall of . Seemed better, now worse again. Chronic foot drop since back surgery many years ago with muscle atrophy.       Review of Systems   Constitutional:  Negative.    Musculoskeletal:  Negative except as documented in history of present illness.    Integumentary:  Negative.    Neurologic      Health Status   Allergies:    Allergic Reactions (All)  Severity Not Documented  Egg Allergy (No reactions were documented)  Canceled/Inactive Reactions (All)  No known allergies   Medications:  (Selected)   Prescriptions  Prescribed  Indocin 50 mg oral capsule: 1 cap(s) ( 50 mg ), PO, TID, PRN: for gout pain, # 30 cap(s), 0 Refill(s), Type: Maintenance, Pharmacy: [id number], 1 cap(s) po tid,PRN:for gout pain  Lipitor 40 mg oral tablet: 1 tab(s) ( 40 mg ), PO, Daily, # 90 tab(s), 3 Refill(s), Type: Maintenance, Pharmacy: [id number], 1 tab(s) po daily  finasteride 5 mg oral tablet: 1 tab(s) ( 5 mg ), PO, Daily, # 90 tab(s), 3 Refill(s), Type: Maintenance, Pharmacy: [id number], 1 tab(s) po daily  predniSONE 20 mg oral tablet: 1 tab(s) ( 20 mg ), PO, Daily, # 7 tab(s), 0 Refill(s), Type: Maintenance, Pharmacy: [id number], 1 tab(s) po daily,x7 day(s)   Problem list:    All Problems (Selected)  BPH (Benign Prostatic Hypertrophy) / ICD-9-.00 / Confirmed  Ed (Erectile Dysfunction) / ICD-9-.84 / Confirmed  Hyperlipidemia NOS /  ICD-9-.4 / Confirmed  Obese / ICD-9-.00 / Probable  Common Peroneal Nerve Dysfunction / SNOMED CT 543252030 / Confirmed      Histories   Past Medical History:    Active  Hyperlipidemia NOS (272.4)  Ed (Erectile Dysfunction) (607.84)  BPH (Benign Prostatic Hypertrophy) (600.00)  Common Peroneal Nerve Dysfunction (146544972)  Obese (278.00)  Resolved  Lumbar disc herniation (946956271): Onset in 2003 at 53 years.  Resolved.  Comments:  9/24/2015 CDT 12:11 PM CDT - Ama Gallo  Left L5-S1 with S1 radiculopathy.  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  Resolved.  Comments:  9/24/2015 CDT 12:15 PM CDT - Ama Gallo  Index fiinger.   Family History:    Heart disease  Grandmother (M) (General Family Hx)  Diabetes mellitus type I  Daughter (Carly)  Son (Arnie)  [place] - Cancer  Brother  Comments:  9/19/2013 2:17 PM - Mya Lopez  Recurring lymphoma  Arthritis  Brother  Stroke  Grandmother (M) (General Family Hx)  Hypercholesterolemia  Mother  Brother  Brother  Cardiovascular Disease  Mother     Procedure history:    Microdiscectomy (593317587) on 11/12/2003 at 53 Years.  Comments:  11/30/2010 4:07 PM - Nela Cage  L5-S1  Colonoscopy (947945055) in 2000 at 50 Years.   Social History:        Alcohol Assessment: Current            Current            2-3 x's per week., 1 drinks/episode average.  2 drinks/episode maximum.      Tobacco Assessment: Denies Tobacco Use            Never      Substance Abuse Assessment: Denies Substance Abuse            Never      Employment and Education Assessment                  Home and Environment Assessment            Marital status: .  Spouse/Partner name: Shaila.      Nutrition and Health Assessment            Type of diet: Regular.      Exercise and Physical Activity Assessment: Does not exercise            Exercise frequency: Never.  Exercise type: Walking.        Physical Examination   Musculoskeletal:  No  deformity, Foot drop on the left with atrophy of the calf. Intact pulses. No rash..    Integumentary:  No rash.       Impression and Plan   Diagnosis     Left foot pain (SFM03-QN M79.672).     Common Peroneal Nerve Dysfunction (JKN02-EU S84.12XA).     Patient Instructions:       Counseled: Patient, Regarding diagnosis, Regarding medications, Activity, Verbalized understanding.    ?Tarsal tunnel syndrome. Will get EMG and then follow with neurology or podiatry.

## 2022-02-15 NOTE — TELEPHONE ENCOUNTER
---------------------  From: Bam Arnold PA-C   To: BIBI SHAW    Sent: 6/17/2020 2:30:51 PM CDT  Subject: Patient Message - Results Notification          This was negative. There are some false negative results with our current testing modality, but it is the test [facility] uses.  Results:  Date Result Name Value   6/16/2020 11:33 AM Coronavirus SARS-CoV-2 (COVID-19) Ab IgG NEGATIVE

## 2022-02-15 NOTE — NURSING NOTE
Comprehensive Intake Entered On:  7/6/2020 8:39 AM CDT    Performed On:  7/6/2020 8:35 AM CDT by Sunshine Harp CMA               Summary   Chief Complaint :   c/o Lower back pain/spasms since Thursday; taking Oxycodone and prednisone, verbal consent given for telephone visit   Sunshine Harp CMA - 7/6/2020 8:35 AM CDT   Health Status   Allergies Verified? :   Yes   Medication History Verified? :   Yes   Medical History Verified? :   Yes   Tobacco Use? :   Never smoker   Sunshine Harp CMA - 7/6/2020 8:35 AM CDT   Consents   Consent for Immunization Exchange :   Consent Granted   Consent for Immunizations to Providers :   Consent Granted   Sunshine Harp CMA - 7/6/2020 8:35 AM CDT   Meds / Allergies   (As Of: 7/6/2020 8:39:36 AM CDT)   Allergies (Active)   No Known Medication Allergies  Estimated Onset Date:   Unspecified ; Created By:   Jessica Bui CMA; Reaction Status:   Active ; Category:   Drug ; Substance:   No Known Medication Allergies ; Type:   Allergy ; Updated By:   Jessica Bui CMA; Reviewed Date:   7/6/2020 8:37 AM CDT        Medication List   (As Of: 7/6/2020 8:39:36 AM CDT)   Prescription/Discharge Order    acetaminophen-oxycodone  :   acetaminophen-oxycodone ; Status:   Prescribed ; Ordered As Mnemonic:   Percocet 5 mg-325 mg oral tablet ; Simple Display Line:   1 tab(s), Oral, q6 hrs, 12 tab(s), 0 Refill(s) ; Ordering Provider:   Daniel Zamarripa MD; Catalog Code:   acetaminophen-oxycodone ; Order Dt/Tm:   7/2/2020 2:26:20 PM CDT          atorvastatin  :   atorvastatin ; Status:   Prescribed ; Ordered As Mnemonic:   Lipitor 40 mg oral tablet ; Simple Display Line:   40 mg, 1 tab(s), PO, Daily, 90 tab(s), 3 Refill(s) ; Ordering Provider:   Bam Arnold PA-C; Catalog Code:   atorvastatin ; Order Dt/Tm:   9/30/2019 11:31:48 AM CDT          finasteride  :   finasteride ; Status:   Prescribed ; Ordered As Mnemonic:   finasteride 5 mg oral tablet ; Simple Display Line:   5 mg, 1 tab(s), PO, Daily, 90  tab(s), 3 Refill(s) ; Ordering Provider:   Bam Arnold PA-C; Catalog Code:   finasteride ; Order Dt/Tm:   9/30/2019 11:31:47 AM CDT          predniSONE  :   predniSONE ; Status:   Prescribed ; Ordered As Mnemonic:   predniSONE 20 mg oral tablet ; Simple Display Line:   40 mg, 2 tab(s), Oral, daily, for 5 day(s), 10 tab(s), 0 Refill(s) ; Ordering Provider:   Daniel Zamarripa MD; Catalog Code:   predniSONE ; Order Dt/Tm:   7/2/2020 2:22:22 PM CDT            Home Meds    acyclovir  :   acyclovir ; Status:   Documented ; Ordered As Mnemonic:   acyclovir 800 mg oral tablet ; Simple Display Line:   800 mg, 1 tab(s), Oral, daily, 0 Refill(s) ; Catalog Code:   acyclovir ; Order Dt/Tm:   7/6/2020 8:36:33 AM CDT          ascorbic acid  :   ascorbic acid ; Status:   Documented ; Ordered As Mnemonic:   Vitamin C ; Simple Display Line:   daily, 0 Refill(s) ; Catalog Code:   ascorbic acid ; Order Dt/Tm:   9/30/2019 11:11:34 AM CDT          calcium-vitamin D  :   calcium-vitamin D ; Status:   Documented ; Ordered As Mnemonic:   calcium (as carbonate)-vitamin D 600 mg-125 intl units oral tablet ; Simple Display Line:   1 tab(s), Oral, tid, 270 tab(s), 0 Refill(s) ; Catalog Code:   calcium-vitamin D ; Order Dt/Tm:   9/30/2019 11:10:43 AM CDT          cyanocobalamin  :   cyanocobalamin ; Status:   Documented ; Ordered As Mnemonic:   Vitamin B-12 ; Simple Display Line:   0 Refill(s) ; Catalog Code:   cyanocobalamin ; Order Dt/Tm:   9/30/2019 11:11:28 AM CDT          cycloSPORINE ophthalmic  :   cycloSPORINE ophthalmic ; Status:   Documented ; Ordered As Mnemonic:   cycloSPORINE 0.1% ophthalmic emulsion ; Simple Display Line:   0 Refill(s) ; Catalog Code:   cycloSPORINE ophthalmic ; Order Dt/Tm:   9/30/2019 11:09:49 AM CDT          echinacea  :   echinacea ; Status:   Documented ; Ordered As Mnemonic:   echinacea ; Simple Display Line:   0 Refill(s) ; Catalog Code:   echinacea ; Order Dt/Tm:   9/30/2019 11:10:11 AM CDT           Miscellaneous Prescription  :   Miscellaneous Prescription ; Status:   Documented ; Ordered As Mnemonic:   cayenne ; Simple Display Line:   0 Refill(s) ; Catalog Code:   Miscellaneous Prescription ; Order Dt/Tm:   9/30/2019 11:11:19 AM CDT          multivitamin  :   multivitamin ; Status:   Documented ; Ordered As Mnemonic:   Multi Vitamin+ ; Simple Display Line:   0 Refill(s) ; Catalog Code:   multivitamin ; Order Dt/Tm:   9/30/2019 11:10:02 AM CDT          omega-3 polyunsaturated fatty acids  :   omega-3 polyunsaturated fatty acids ; Status:   Documented ; Ordered As Mnemonic:   Fish Oil ; Simple Display Line:   Oral, 0 Refill(s) ; Catalog Code:   omega-3 polyunsaturated fatty acids ; Order Dt/Tm:   9/30/2019 11:11:05 AM CDT          prednisoLONE ophthalmic  :   prednisoLONE ophthalmic ; Status:   Documented ; Ordered As Mnemonic:   prednisoLONE acetate 1% ophthalmic suspension ; Simple Display Line:   0 Refill(s) ; Catalog Code:   prednisoLONE ophthalmic ; Order Dt/Tm:   2/4/2019 11:28:33 AM CST ; Comment:   Responsible Provider: ZACH KEENAN            ID Risk Screen   Recent Travel History :   Last travel within 7 days   Recent Travel Location :   Other: [facility]   Family Member Travel History :   Last travel within 7 days   Other Exposure to Infectious Disease :   Unknown   Sunshine Harp CMA - 7/6/2020 8:35 AM CDT

## 2022-02-15 NOTE — TELEPHONE ENCOUNTER
---------------------  From: Dinora Allison (Appointment Pool ([id number]_[place] - [place]))   To: BIBI SHAW    Sent: 2019 9:05:34 AM CDT  Subject: RE: Appointment Request     Scheduled Lab Appointment in [place] on  @ 10:00    Suzie    ---------------------  From: BIBI SHAW  To: [facility]  Sent: 2019 10:14 a.m. CDT  Subject: Appointment Request  Patient Name: BIBI SHAW  Patient : 1950  Appointment Dates: Between 2019 and 2019  Preferred Days: Tuesday  Preferred Time: 10AM  Contact Patient by: Secure Message    Reason for Visit:    Blood test as ordered by Dr Koroma

## 2022-02-15 NOTE — TELEPHONE ENCOUNTER
---------------------  From: Candy Irby LPN (Phone Messages Pool ([id number]))   To: [facility] ([address] [place] [address]);     Sent: 11/5/2021 9:43:48 AM CDT  Subject: CONSUMER MESSAGE FW: Prescription dosage     Pt is on atorvastatin 40mg daily      ---------------------  From: BIBI SHAW  To: [facility]  Sent: 11/05/2021 09:35 a.m. CDT  Subject: Prescription dosage  Do we want to lower my dosage for Atorvastatin now the my lipids are under control?---------------------  From: Sunshien Harp CMA ([facility] Message Pool ([id number]))   To: Bam Arnold PA-C;     Sent: 11/5/2021 9:45:03 AM CDT  Subject: FW: CONSUMER MESSAGE FW: Prescription dosage---------------------  From: Bam Arnold PA-C   To: [employer] Message Pool ([id number]);     Sent: 11/5/2021 9:53:43 AM CDT  Subject: RE: CONSUMER MESSAGE FW: Prescription dosage     I would continue current medication at current dose    KAH---------------------  From: Sunshine Harp CMA ([employer] Message Pool ([id number]))   To: BIBI SHWA    Sent: 11/5/2021 10:05:29 AM CDT  Subject: FW: CONSUMER MESSAGE FW: Prescription dosage

## 2022-02-15 NOTE — TELEPHONE ENCOUNTER
Order is faxed to [facility] and they will contact patient.pt LM at [phone] stating he still hasn't heard on MRI  contacted pt and per referrals gave him [facility]'s # [phone]

## 2022-02-15 NOTE — NURSING NOTE
Comprehensive Intake Entered On:  7/30/2020 12:55 PM CDT    Performed On:  7/30/2020 12:49 PM CDT by Blaise Chatterjee CMA               Summary   Chief Complaint :   Pt here for left foot/ankle edema and pain that radiates up into leg x 2-3 weeks   Weight Measured :   222 lb(Converted to: 222 lb 0 oz, 100.70 kg)    Height Measured :   73 in(Converted to: 6 ft 1 in, 185.42 cm)    Body Mass Index :   29.29 kg/m2 (HI)    Body Surface Area :   2.28 m2   Systolic Blood Pressure :   118 mmHg   Diastolic Blood Pressure :   72 mmHg   Mean Arterial Pressure :   87 mmHg   Peripheral Pulse Rate :   72 bpm   BP Site :   Right arm   Pulse Site :   Radial artery   Temperature Tympanic :   98.9 DegF(Converted to: 37.2 DegC)    Respiratory Rate :   16 br/min   Blaise Chatterjee CMA - 7/30/2020 12:49 PM CDT   Health Status   Allergies Verified? :   Yes   Medication History Verified? :   Yes   Medical History Verified? :   Yes   Pre-Visit Planning Status :   Not completed   Tobacco Use? :   Never smoker   Blaise Chatterjee CMA - 7/30/2020 12:49 PM CDT   Meds / Allergies   (As Of: 7/30/2020 12:55:42 PM CDT)   Allergies (Active)   No Known Medication Allergies  Estimated Onset Date:   Unspecified ; Created By:   Jessica Bui CMA; Reaction Status:   Active ; Category:   Drug ; Substance:   No Known Medication Allergies ; Type:   Allergy ; Updated By:   Jessica Bui CMA; Reviewed Date:   7/30/2020 12:53 PM CDT        Medication List   (As Of: 7/30/2020 12:55:42 PM CDT)   Prescription/Discharge Order    acetaminophen-oxycodone  :   acetaminophen-oxycodone ; Status:   Prescribed ; Ordered As Mnemonic:   Percocet 5/325 oral tablet ; Simple Display Line:   1 tab(s), Oral, q4 hrs, PRN: for pain, 30 tab(s), 0 Refill(s) ; Ordering Provider:   Bam Arnold PA-C; Catalog Code:   acetaminophen-oxycodone ; Order Dt/Tm:   7/28/2020 1:08:16 PM CDT          cyclobenzaprine  :   cyclobenzaprine ; Status:   Prescribed ; Ordered As Mnemonic:   cyclobenzaprine  10 mg oral tablet ; Simple Display Line:   10 mg, 1 tab(s), Oral, tid, PRN: for spasm, 30 tab(s), 0 Refill(s) ; Ordering Provider:   Bam Arnold PA-C; Catalog Code:   cyclobenzaprine ; Order Dt/Tm:   7/28/2020 1:07:28 PM CDT          atorvastatin  :   atorvastatin ; Status:   Prescribed ; Ordered As Mnemonic:   Lipitor 40 mg oral tablet ; Simple Display Line:   40 mg, 1 tab(s), PO, Daily, 90 tab(s), 3 Refill(s) ; Ordering Provider:   Bam Arnold PA-C; Catalog Code:   atorvastatin ; Order Dt/Tm:   9/30/2019 11:31:48 AM CDT          finasteride  :   finasteride ; Status:   Prescribed ; Ordered As Mnemonic:   finasteride 5 mg oral tablet ; Simple Display Line:   5 mg, 1 tab(s), PO, Daily, 90 tab(s), 3 Refill(s) ; Ordering Provider:   Bam Arnold PA-C; Catalog Code:   finasteride ; Order Dt/Tm:   9/30/2019 11:31:47 AM CDT            Home Meds    acyclovir  :   acyclovir ; Status:   Documented ; Ordered As Mnemonic:   acyclovir 800 mg oral tablet ; Simple Display Line:   800 mg, 1 tab(s), Oral, daily, 0 Refill(s) ; Catalog Code:   acyclovir ; Order Dt/Tm:   7/6/2020 8:36:33 AM CDT          omega-3 polyunsaturated fatty acids  :   omega-3 polyunsaturated fatty acids ; Status:   Documented ; Ordered As Mnemonic:   Fish Oil ; Simple Display Line:   Oral, 0 Refill(s) ; Catalog Code:   omega-3 polyunsaturated fatty acids ; Order Dt/Tm:   9/30/2019 11:11:05 AM CDT          Miscellaneous Prescription  :   Miscellaneous Prescription ; Status:   Documented ; Ordered As Mnemonic:   cayenne ; Simple Display Line:   0 Refill(s) ; Catalog Code:   Miscellaneous Prescription ; Order Dt/Tm:   9/30/2019 11:11:19 AM CDT          ascorbic acid  :   ascorbic acid ; Status:   Documented ; Ordered As Mnemonic:   Vitamin C ; Simple Display Line:   daily, 0 Refill(s) ; Catalog Code:   ascorbic acid ; Order Dt/Tm:   9/30/2019 11:11:34 AM CDT          cyanocobalamin  :   cyanocobalamin ; Status:   Documented ; Ordered As Mnemonic:    Vitamin B-12 ; Simple Display Line:   0 Refill(s) ; Catalog Code:   cyanocobalamin ; Order Dt/Tm:   9/30/2019 11:11:28 AM CDT          multivitamin  :   multivitamin ; Status:   Documented ; Ordered As Mnemonic:   Multi Vitamin+ ; Simple Display Line:   0 Refill(s) ; Catalog Code:   multivitamin ; Order Dt/Tm:   9/30/2019 11:10:02 AM CDT          echinacea  :   echinacea ; Status:   Documented ; Ordered As Mnemonic:   echinacea ; Simple Display Line:   0 Refill(s) ; Catalog Code:   echinacea ; Order Dt/Tm:   9/30/2019 11:10:11 AM CDT          calcium-vitamin D  :   calcium-vitamin D ; Status:   Documented ; Ordered As Mnemonic:   calcium (as carbonate)-vitamin D 600 mg-125 intl units oral tablet ; Simple Display Line:   1 tab(s), Oral, tid, 270 tab(s), 0 Refill(s) ; Catalog Code:   calcium-vitamin D ; Order Dt/Tm:   9/30/2019 11:10:43 AM CDT          cycloSPORINE ophthalmic  :   cycloSPORINE ophthalmic ; Status:   Documented ; Ordered As Mnemonic:   cycloSPORINE 0.1% ophthalmic emulsion ; Simple Display Line:   0 Refill(s) ; Catalog Code:   cycloSPORINE ophthalmic ; Order Dt/Tm:   9/30/2019 11:09:49 AM CDT          prednisoLONE ophthalmic  :   prednisoLONE ophthalmic ; Status:   Documented ; Ordered As Mnemonic:   prednisoLONE acetate 1% ophthalmic suspension ; Simple Display Line:   0 Refill(s) ; Catalog Code:   prednisoLONE ophthalmic ; Order Dt/Tm:   2/4/2019 11:28:33 AM CST ; Comment:   Responsible Provider: ZACH KEENAN            ID Risk Screen   Recent Travel History :   No recent travel   Family Member Travel History :   No recent travel   Other Exposure to Infectious Disease :   Unknown   Blaise Chatterjee CMA - 7/30/2020 12:49 PM CDT   Social History   Social History   (As Of: 7/30/2020 12:55:42 PM CDT)   Alcohol:  Current      1-2 times per week, 1 drinks/episode average.  Ready to change: No.   Comments:  9/3/2010 3:24 PM - Melanie Mcintyre LPN: occ, very little   (Last Updated: 4/9/2020 11:48:02 AM  CDT by Mya Lopez)          Tobacco:  Denies Tobacco Use      Never   (Last Updated: 9/6/2012 8:06:39 AM CDT by Mya Lopez)          Substance Abuse:  Denies Substance Abuse      Never   (Last Updated: 9/6/2012 8:06:45 AM CDT by Mya Lopez)          Employment/School:        Retired   (Last Updated: 9/13/2018 2:20:23 PM CDT by Mya Lopez)          Home/Environment:        Marital status: .  Spouse/Partner name: Shaila.  Living situation: Home/Independent.  Injuries/Abuse/Neglect in household: No.  Feels unsafe at home: No.  Family/Friends available for support: Yes.   (Last Updated: 4/9/2020 11:48:44 AM CDT by Mya Lopez)          Nutrition/Health:        Type of diet: Regular.  Wants to lose weight: No.  Sleeping concerns: No.  Feels highly stressed: No.   (Last Updated: 4/9/2020 11:48:16 AM CDT by Mya Lopez)          Exercise:  Regular exercise      Exercise frequency: 5-6 times/week.  Exercise type: Walking.   (Last Updated: 12/5/2018 1:38:42 PM CST by Mya Lopez)          Sexual:        Sexually active: Yes.  Identifies as male, Sexual orientation: Straight or heterosexual.  History of STD: No.  Contraceptive Use Details: None.  History of sexual abuse: No.   (Last Updated: 4/9/2020 11:49:12 AM CDT by Mya Lopez)

## 2022-02-15 NOTE — TELEPHONE ENCOUNTER
---------------------  From: Abril Vega CMA (Phone Messages Pool ([id number]_[place] [facility]))   To: Bam Arnold PA-C;     Sent: 3/26/2019 11:49:05 AM CDT  Subject: Phone Message : Medication Management     PCP:   KAH      Time of Call:  10:31am       Person Calling:  Get  Phone number:  [phone]  Leave a detailed Message:     Returned call at: 11:47am    Note:   patient called requesting a refill on his Atorvastatin and Finasteride to be sent to [facility].       Last office visit and reason:  2/4/19     [facility]    FWD to: KAH---------------------  From: Bam Arnold PA-C   To: Phone Messages Pool ([id number]_[place] [facility]);     Sent: 3/26/2019 1:31:41 PM CDT  Subject: RE: Phone Message : Medication Management     Done    [place] Call    Time: 1:55pm    Note: Called to let patient know that rx was filled and sent to pharmacy

## 2022-02-15 NOTE — RESULTS
-------------------------------- Original Report -------------------------------    EXAM:  MR LUMBAR SPINE WITHOUT CONTRAST 0.6T OPEN UPRIGHT    CLINICAL INFORMATION:  Bilateral back pain with tingling and numbness in the  left leg. History of previous surgery in 2003. History of osteoporosis and  history of CLL. No injury.    TECHNICAL INFORMATION:  T1, T2 and STIR sagittal sections through the lumbar  spine with T2 FSE coronal sections, T1 and T2 FSE stacked axial sections at  selected levels.          COMPARISON IMAGES:  MRI dated 5/3/2017.     INTERPRETATION:  Segmentation and Alignment: Lordotic alignment of five lumbar  vertebrae with a mild mid lumbar curve to the right.    Osseous structures: A moderate 50% subacute compression fracture is visualized  within the L1 vertebral body with moderate marrow edema and involvement of both  the superior and inferior endplates. An old mild to moderate compression  fracture is visualized at T12. No involvement of the posterior elements.  Mildly  decreased marrow signal without an osteolytic or destructive bone lesion.    Sacrum and Sacroiliac joints: Normal sacrum and sacroiliac joints.     L5-S1: Moderate to advanced disc degeneration with marked disc space narrowing  on the right. Moderately severe bilateral foraminal stenosis with mild facet  arthrosis and partial effacement of L5 perineural fat.    L4-5: Advanced disc degeneration with mild narrowing of the central canal.  Moderate bilateral foraminal stenosis. Facet joints unremarkable.    L3-4: Advanced disc degeneration with moderate narrowing of the central canal  and partial effacement of subarachnoid space. Moderate bilateral foraminal  stenosis with mild left facet hypertrophy and partial effacement of left L3  perineural fat.    L2-3: Moderate disc degeneration with dorsal bulging/retrolisthesis and mild  central/bilateral subarticular recess stenosis. Mild narrowing of the neural  foramina.    L1-2, T12-L1  and T11-12: Mild disc degeneration with dorsal bulging and ventral  cord abutment at T12-L1 and T11-12. Mild to moderate facet arthropathy and  foraminal stenosis are visualized on the right at T12-L1 and T11-12.    Conus: Normal signal intensity within the conus medullaris.  No intradural mass  or arachnoidal adhesions.     Paraspinous structures:  No paraspinous soft tissue mass or hematoma. No  retroperitoneal lymphadenopathy.    CONCLUSION:  Diffuse lumbar disc degeneration with specific findings as follows:  1. Moderate 50% subacute compression fracture of the L1 vertebral body on the  left with moderate marrow edema.  2. Old benign-appearing compression fracture T12 with a clinical history of  osteoporosis.  3. Mildly decreased marrow signal, no osteolytic or destructive bone lesions and  no retroperitoneal lymphadenopathy.  4. Central canal stenosis, moderate at L3-4 with partial effacement of  subarachnoid space, and mild at L4-5 and L2-3.  5. Foraminal stenosis, moderately severe bilaterally at L5-S1, and moderate  bilaterally at L4-5 and L3-4.  6. Comparison with 5/3/2017 shows that the L1 compression fracture is new in the  interval.      Read by: Salbador Valiente M.D.  Reviewed and Electronically Signed by: Salbador Valiente M.D.

## 2022-02-15 NOTE — TELEPHONE ENCOUNTER
---------------------  From: Radha Nj MA (Phone Messages Pool ([id number]_[facility]))   To: [facility] Message Pool ([id number]);     Sent: 3/1/2021 4:30:10 PM CST  Subject: FW: For Bam Arnold           ---------------------  From: BIBI SHAW  To: [facility]  Sent: 03/01/2021 04:15 p.m. CST  Subject: For Bam Arnold  The soonest appt I can get at [facility] is April 12th.  Do you think I should go with that.  My pain is substantially better now.---------------------  From: Sunshine Harp CMA ([employer] Message Pool ([id number]))   To: Bam Arnold PA-C;     Sent: 3/2/2021 7:10:08 AM CST  Subject: CONSUMERMESSAGE: For Bam Arnold---------------------  From: Bam Arnold PA-C   To: [employer] Message Pool ([id number]_[facility]);     Sent: 3/2/2021 7:18:22 AM CST  Subject: RE: CONSUMERMESSAGE: For Bam Arnold     We could try the [facility] for earlier appointment if he wants to do that. Otherwise he can wait.    MUSHTAQ---------------------  From: Sunshine Harp CMA ([employer] Message Pool ([id number]_[facility]))   To: BIBI SHAW    Sent: 3/2/2021 9:03:47 AM CST  Subject: FW: CONSUMERMESSAGE: For Bam Arnold

## 2022-02-15 NOTE — NURSING NOTE
CAGE Assessment Entered On:  10/1/2019 7:58 AM CDT    Performed On:  9/30/2019 7:58 AM CDT by Sunshine Harp CMA               Assessment   Have you ever felt you should cut down on your drinking :   No   Have people annoyed you by criticizing your drinking :   No   Have you ever felt bad or guilty about your drinking :   No   Have you ever taken a drink first thing in the morning to steady your nerves or get rid of a hangover (Eye-opener) :   No   CAGE Score :   0    Sunshine aHrp CMA - 10/1/2019 7:58 AM CDT

## 2022-02-15 NOTE — TELEPHONE ENCOUNTER
---------------------  From: Abril Shaw CMA ([id number]   To: Bam Arnold PA-C;     Sent: 7/17/2020 2:22:07 PM CDT  Subject: CONSUEMER MESSAGE : Message to Bam Arnold     Please advise      ---------------------  From: BIBI SHAW  To: [facility]  Sent: 07/17/2020 02:14 p.m. CDT  Subject: Message to Bam Arnold  They concluded that my pain was not from the compression fracture but rather from the nerve tunnel just like before.   I m scheduled for a cortisone injection in the nerve tunnel area next Tuesday.   Do you think there is high risk that if I resume doing things my pain wouldn t allow me to do that I  could sever another nerve from bone fragments in the nerve tunnel?  Do we know there are or are not bone fragments in the nerve tunnel from the images?  Am I doing the best available option?---------------------  From: Bam Arnold PA-C   To: Phone Messages Pool ([id number]_[facility]);     Sent: 7/17/2020 2:31:49 PM CDT  Subject: RE: CONSUEMER MESSAGE : Message to Bam Arnold     I did call him. I think safe to proceed with injection.    KAHnoted

## 2022-02-15 NOTE — PROGRESS NOTES
Patient:   BIBI SHAW            MRN: [id number]            FIN: [id number]               Age:   71 years     Sex:  Male     :  1950   Associated Diagnoses:   Screening for prostate cancer; Hyperlipidemia NOS; Obesity, unspecified; CLL (chronic lymphocytic leukemia); Medicare annual wellness visit, subsequent   Author:   Bam Arnodl PA-C      Visit Information      Date of Service: 2021 10:47 am  Performing Location: [facility]  Encounter#: [id number]      Primary Care Provider (PCP):  Bam Arnold PA-C    NPI# [id number]      Referring Provider:  Bam Arnold PA-C    NPI# [id number]   Visit type:  General concerns, Annual Wellness Visit.    Source of history:  Self.    History limitation:  None.       Chief Complaint   2021 10:50 AM CDT   AW     Annual Wellness Medicare Physical      History of Present Illness             The patient presents for a general exam.  Complaint:.  Review of functional ability.  1. Hearing impairment (Hearing aids, symptoms, history)      2. Activities of daily living (Hygiene, eating, cooking, cleaning, shopping, errands )  3. Falls risk (Walking, transferring, cane, walker, aids)   4. Home safety (Surfaces, steps)   .     Oncology [place] for CLL      Review of Systems   Constitutional:  Fatigue.    Eye:  Negative.    Ear/Nose/Mouth/Throat:  Negative.    Respiratory:  Negative.    Cardiovascular:  Negative.    Gastrointestinal:  Negative.    Genitourinary:  Negative.    Hematology/Lymphatics:  Negative except as documented in history of present illness.    Endocrine:  Negative.    Immunologic:  Negative except as documented in history of present illness.    Musculoskeletal:       Back pain: Bilaterally, In the lower region.    Integumentary:  Negative except as documented in history of present illness.    Neurologic:  Negative except as documented in history of present illness.    Psychiatric:  Negative.       Health Status   Allergies:     Allergic Reactions (Selected)  No Known Medication Allergies   Medications:  (Selected)   Prescriptions  Prescribed  Lipitor 40 mg oral tablet: = 1 tab(s) ( 40 mg ), PO, Daily, # 90 tab(s), 3 Refill(s), Type: Maintenance, Pharmacy: [facility], 1 tab(s) Oral daily, 73, in, 11/01/21 10:50:00 CDT, Height Measured, 221.6, lb, 11/01/21 10:50:00 CDT, Weight Measured  cyclobenzaprine 10 mg oral tablet: = 1 tab(s) ( 10 mg ), Oral, tid, PRN: for spasm, # 30 tab(s), 0 Refill(s), Type: Maintenance, Pharmacy: [facility] #[id number], 1 tab(s) Oral tid,PRN:for spasm, 73, in, 09/30/19 10:57:00 CDT, Height Measured, 224, lb, 06/16/20 11:33:00 CDT, Weigh...  finasteride 5 mg oral tablet: = 1 tab(s) ( 5 mg ), PO, Daily, # 90 tab(s), 3 Refill(s), Type: Maintenance, [id number], 1 tab(s) Oral daily, 73, in, 11/01/21 10:50:00 CDT, Height Measured, 221.6, lb, 11/01/21 10:50:00 CDT, Weight Measured  triamcinolone 0.1% topical cream: See Instructions, Instructions: APPLY TO AFFECTED AREA TWICE A DAY, # 30 gm, 0 Refill(s), Type: Acute, Pharmacy: [id number], APPLY TO AFFECTED AREA TWICE A DAY, 73, in, 10/28/20 13:58:00 CDT, Height Measured, 223.8, lb, 10/28/20 13:58:0...  Documented Medications  Documented  Fish Oil: Oral, 0 Refill(s), Type: Maintenance  Multi Vitamin+: 0 Refill(s), Type: Maintenance  Vitamin B-12: 0 Refill(s), Type: Maintenance  Vitamin C: daily, 0 Refill(s), Type: Maintenance  acyclovir 800 mg oral tablet: = 1 tab(s) ( 800 mg ), Oral, daily, 0 Refill(s), Type: Maintenance  calcium (as carbonate)-vitamin D 600 mg-125 intl units oral tablet: 1 tab(s), Oral, tid, # 270 tab(s), 0 Refill(s), Type: Maintenance  cayenne: cayenne, 0 Refill(s), Type: Maintenance  cycloSPORINE 0.1% ophthalmic emulsion: 0 Refill(s), Type: Maintenance  echinacea: 0 Refill(s), Type: Maintenance  ferrous sulfate: Oral, 0 Refill(s), Type: Maintenance  lisinopril 10 mg oral tablet: Instructions: 90 EA, 0  Refill(s), Type: Soft Stop  prednisoLONE acetate 1% ophthalmic suspension: 0 Refill(s), Type: Soft Stop  prochlorperazine 10 mg oral tablet: Instructions: 30 EA, 0 Refill(s), Type: Soft Stop  venetoclax 100 mg oral tablet: = 4 tab(s) ( 400 mg ), Oral, daily, 0 Refill(s), Type: Maintenance   Problem list:    All Problems (Selected)  Male erectile dysfunction, unspecified / SNOMED CT 1884009010 / Confirmed  Hyperlipidemia, unspecified / SNOMED CT 27143250 / Confirmed  Hyperlipidemia NOS / SNOMED CT 4858580786 / Confirmed  Fuchs' corneal dystrophy / SNOMED CT 068638033 / Confirmed  Common Peroneal Nerve Dysfunction / SNOMED CT 774806559 / Confirmed  CLL (chronic lymphocytic leukemia) / SNOMED CT 871593794 / Confirmed  Benign prostatic hyperplasia without lower urinary tract symptoms / SNOMED CT 4126820099 / Confirmed  Annual physical exam / SNOMED CT 802855371 / Confirmed      Histories   Past Medical History:    Active  Hyperlipidemia, unspecified (11641490)  Male erectile dysfunction, unspecified (5519435043)  Benign prostatic hyperplasia without lower urinary tract symptoms (3035288125)  Common Peroneal Nerve Dysfunction (564551717)  CLL (chronic lymphocytic leukemia) (006184581)  Fuchs' corneal dystrophy (343032565)  Hyperlipidemia NOS (4981289300)  Resolved  Lumbar disc herniation (026973517): Onset in  at 53 years.  Resolved.  Comments:  2015 CDT 12:11 PM CDT - Ama Gallo  Left L5-S1 with S1 radiculopathy.  Amputation of finger of left hand (627780025): Onset in  at 27 years.  Resolved.  Comments:  2015 CDT 12:15 PM CDT - Ama Gallo  Index fiinger.   Family History:    Diabetes mellitus  Son (Reji)  Hypertension  Father ()  Heart disease  Grandmother (M) (General Family Hx)  Diabetes mellitus type I  Daughter (Carly)  Son (Arnie)  Allergic rhinitis  Father ()  CA - Cancer  Brother  Comments:  2013 2:17 PM CDT - Mya Lopez  lymphoma  Arthritis  Brother  Mother  Stroke  Grandmother (M) (General Family Hx)  Hypercholesterolemia  Mother  Brother  Brother  Father ()  Alzheimer's disease  Mother  Cardiovascular Disease  Mother     Procedure history:    Corneal transplant (029365987) in the month of 2018 at 68 Years.  Comments:  2019 9:35 AM CST - Hao SAMMJessica  Right Eye  Microdiscectomy (766846292) on 2003 at 53 Years.  Comments:  2010 4:07 PM CST - GalinasamsonNela palafox  L5-S1  Colonoscopy (313819971) in  at 50 Years.   Social History:        Electronic Cigarette/Vaping Assessment            Electronic Cigarette Use: Never.      Alcohol Assessment            Current, 1-2 times per month, 1 drinks/episode average.  Ready to change: No.                     Comments:                      2010 - Melanie Mcintyre LPN, very little      Tobacco Assessment: Denies Tobacco Use            Never (less than 100 in lifetime)            Never      Substance Abuse Assessment: Denies Substance Abuse            Never      Employment and Education Assessment            Retired      Home and Environment Assessment            Marital status: .  Spouse/Partner name: Shaila.  Living situation: Home/Independent.               Injuries/Abuse/Neglect in household: No.  Feels unsafe at home: No.  Family/Friends available for support:               Yes.      Nutrition and Health Assessment            Type of diet: Regular.  Wants to lose weight: Yes.  Sleeping concerns: No.  Feels highly stressed: Yes.      Exercise and Physical Activity Assessment: Occasional exercise            Exercise frequency: Daily.  Exercise type: Walking.      Sexual Assessment            Sexually active: Yes.  Identifies as male, Sexual orientation: Straight or heterosexual.  History of STD: No.               Contraceptive Use Details: None.  History of sexual abuse: No.        Physical Examination   EXAM AT PROVIDER  DISCRETION        Vital Signs   11/1/2021 10:50 AM CDT Temperature Tympanic 96.2 DegF  LOW    Peripheral Pulse Rate 64 bpm    Pulse Site Radial artery    HR Method Electronic    Systolic Blood Pressure 124 mmHg    Diastolic Blood Pressure 76 mmHg    Mean Arterial Pressure 92 mmHg    BP Site Right arm    BP Method Electronic      Measurements from flowsheet : Measurements   11/1/2021 10:50 AM CDT Height Measured - Standard 73 in    Weight Measured - Standard 221.6 lb    BSA 2.27 m2    Body Mass Index 29.23 kg/m2  HI      General:  Alert and oriented, No acute distress.    Eye:  Pupils are equal, round and reactive to light, Extraocular movements are intact, Normal conjunctiva.    HENT:  Normocephalic, Tympanic membranes are clear, Oral mucosa is moist.    Neck:  Supple, Non-tender, No lymphadenopathy.    Respiratory:  Lungs are clear to auscultation, Respirations are non-labored.    Cardiovascular:  Normal rate, Regular rhythm.    Gastrointestinal:  Soft, Non-tender, No organomegaly.    Genitourinary:  No costovertebral angle tenderness.    Musculoskeletal:  Left foot drop, stable. Unable to wear brace..    Integumentary:  No rash.    Cognition and Speech:  Oriented, Speech clear and coherent, Functional cognition intact,     What is the year? 2021    What is the date? Nov 1    What city do you live in?   [place]       .    Psychiatric:  Cooperative.       Health Maintenance      Recommendations     Pending (in the next year)        OverDue           Colorectal Cancer Screen (Colonoscopy) due  09/25/18  and every 1  year(s)           Colorectal Cancer Screen (Occult Blood) due  09/25/18  and every 1  year(s)           Colorectal Cancer Screen (Sigmoidoscopy) due  09/25/18  and every 1  year(s)           Lipid Disorders Screen due  09/15/21  and every 1  year(s)        Due            Abdominal Aortic Aneurysm Screen (if hx of smoking) due  11/01/21  One-time only           Fall Risk Screen due  11/01/21  and every 1   year(s)           HIV Screen (if sexually active) due  11/01/21  and every 1  year(s)           Hepatitis C Screen [id number] due  11/01/21  One-time only           Lung Cancer Screen due  11/01/21  and every 1  year(s)           Syphilis Screen (if sexually active) due  11/01/21  and every 1  year(s)     Satisfied (in the past 1 year)        Satisfied            Alcohol Misuse Screen on  11/01/21.           Alcohol Misuse Screen on  11/01/21.           Body Mass Index Check on  11/01/21.           Body Mass Index Check on  02/26/21.           COVID-19 Vaccine (Moderna) Dose 2 on  03/12/21.           COVID-19 Vaccine (Moderna) Dose 1 on  02/12/21.           Coronavirus (COVID-19) Vaccine (Moderna) on  08/18/21.           Depression Screen on  11/01/21.           High Blood Pressure Screen on  11/01/21.           High Blood Pressure Screen on  02/26/21.           Influenza Vaccine on  10/05/21.           Tobacco Use Screen on  11/01/21.        Canceled            STD Counseling (If sexually active) on  10/28/21.          Review / Management   Differential diagnosis   Results review:  INCLUDE PHQ 9.    Documentation reviewed:  Brings in home BP readings. All well controlled.   .    RTC in one year and PRN.      Impression and Plan   Diagnosis     Screening for prostate cancer (UNW64-MC Z12.5).     Hyperlipidemia NOS (EWC89-UY E78.5).     Obesity, unspecified (EKC07-ZR E66.9).     CLL (chronic lymphocytic leukemia) (AIV59-XZ C91.90).     Medicare annual wellness visit, subsequent (IVK19-NO Z00.00).     Patient Instructions:       Counseled: Patient, Regarding diagnosis, Regarding treatment, Regarding medications, Activity, Verbalized understanding.    Orders     Orders (Selected)   Outpatient Orders  Ordered  CBC Automated w/ Auto Diff (Request): Priority: STAT, CLL (chronic lymphocytic leukemia)  Ordered (Dispatched)  Lipid panel with reflex to direct ldl* ([facility]): Specimen Type: Serum, Collection Date: 11/01/21  10:54:00 CDT  PSA, Total (Room)* ([facility]): Specimen Type: Serum, Collection Date: 11/01/21 10:54:00 CDT  Completed   ppps, subseq visit (Charge): Quantity: 1, Medicare annual wellness visit, subsequent  Hyperlipidemia NOS  Obesity, unspecified  CLL (chronic lymphocytic leukemia)  Screening for prostate cancer  Prescriptions  Prescribed  Lipitor 40 mg oral tablet: = 1 tab(s) ( 40 mg ), PO, Daily, # 90 tab(s), 3 Refill(s), Type: Maintenance, [facility]acy[id number], 1 tab(s) Oral daily, 73, in, 11/01/21 10:50:00 CDT, Height Measured, 221.6, lb, 11/01/21 10:50:00 CDT, Weight Measured  finasteride 5 mg oral tablet: = 1 tab(s) ( 5 mg ), PO, Daily, # 90 tab(s), 3 Refill(s), Type: Maintenance, [id number], 1 tab(s) Oral daily, 73, in, 11/01/21 10:50:00 CDT, Height Measured, 221.6, lb, 11/01/21 10:50:00 CDT, Weight Measured  Documented Medications  Documented  lisinopril 10 mg oral tablet: Instructions: 90 EA, 0 Refill(s), Type: Soft Stop  prochlorperazine 10 mg oral tablet: Instructions: 30 EA, 0 Refill(s), Type: Soft Stop  venetoclax 100 mg oral tablet: = 4 tab(s) ( 400 mg ), Oral, daily, 0 Refill(s), Type: Maintenance.     Will hold his lisinopril. Monitor BP readings. I want him below 130/80.

## 2022-02-15 NOTE — PROGRESS NOTES
Patient:   BIBI SHAW            MRN: [id number]            FIN: [id number]               Age:   70 years     Sex:  Male     :  1950   Associated Diagnoses:   Vasculitis of skin   Author:   Bam Arnold PA-C      Visit Information   Visit type:  General concerns.    Accompanied by:  No one.    Source of history:  Self, Medical record.    History limitation:  None.       Chief Complaint   2021 7:48 AM CST    c/o intense pain in left foot, disrupting sleep, purple color in toes, some bleeding from red spots on foot        History of Present Illness             The patient presents with pain.  The symptom(s) is described as pain, swelling and bleeding.  The location of symptom(s) is bilateral, foot and toes.  The severity of the symptom(s) is moderate.  The timing/course of symptom(s) is episodic, fluctuates in intensity and is worsening.  The symptom(s) has lasted for 6 month(s).  Has CLL. Follows with Heme/Onc. WBC counts have been climbing. Has discoloration of feet and now becoming more painful. Bleed at times. Pltc has been fine. No new medications. Hurt mostly at rest. .  Has been to derm. They thought it might be a viral infection. First time at this clinic for this complaint..        Review of Systems   Constitutional:  Negative.    Hematology/Lymphatics:  Negative except as documented in history of present illness.    Musculoskeletal:  Negative except as documented in history of present illness.    Integumentary:  Negative except as documented in history of present illness.    Neurologic:  Negative.       Health Status   Allergies:    Allergic Reactions (Selected)  No Known Medication Allergies   Medications:  (Selected)   Prescriptions  Prescribed  Lipitor 40 mg oral tablet: = 1 tab(s) ( 40 mg ), PO, Daily, # 90 tab(s), 3 Refill(s), Type: Maintenance, [facility]acy[id number], 1 tab(s) Oral daily, 73, in, 10/28/20 13:58:00 CDT, Height Measured, 223.8, lb, 10/28/20 13:58:00 CDT, Weight  Measured  cyclobenzaprine 10 mg oral tablet: = 1 tab(s) ( 10 mg ), Oral, tid, PRN: for spasm, # 30 tab(s), 0 Refill(s), Type: Maintenance, [id number], 1 tab(s) Oral tid,PRN:for spasm, 73, in, 09/30/19 10:57:00 CDT, Height Measured, 224, lb, 06/16/20 11:33:00 CDT, Weigh...  finasteride 5 mg oral tablet: = 1 tab(s) ( 5 mg ), PO, Daily, # 90 tab(s), 3 Refill(s), Type: Maintenance, Pharmacy: [facility], 1 tab(s) Oral daily, 73, in, 10/28/20 13:58:00 CDT, Height Measured, 223.8, lb, 10/28/20 13:58:00 CDT, Weight Measured  predniSONE 10 mg oral tablet: 4 tabs daily x 4, then 3 tabs daily x 4, then 2 tabs daily x 4, then 1 tab daily x 4., Oral, daily, # 40 tab(s), 0 Refill(s), Type: Maintenance, Pharmacy: [facility] #[id number], 4 tabs daily x 4, then 3 tabs daily x 4, then 2 tabs daily x 4, the...  triamcinolone 0.1% topical cream: See Instructions, Instructions: APPLY TO AFFECTED AREA TWICE A DAY, # 30 gm, 0 Refill(s), Type: Acute, Pharmacy: [id number], APPLY TO AFFECTED AREA TWICE A DAY, 73, in, 10/28/20 13:58:00 CDT, Height Measured, 223.8, lb, 10/28/20 13:58:0...  Documented Medications  Documented  Fish Oil: Oral, 0 Refill(s), Type: Maintenance  Multi Vitamin+: 0 Refill(s), Type: Maintenance  Vitamin B-12: 0 Refill(s), Type: Maintenance  Vitamin C: daily, 0 Refill(s), Type: Maintenance  acyclovir 800 mg oral tablet: = 1 tab(s) ( 800 mg ), Oral, daily, 0 Refill(s), Type: Maintenance  calcium (as carbonate)-vitamin D 600 mg-125 intl units oral tablet: 1 tab(s), Oral, tid, # 270 tab(s), 0 Refill(s), Type: Maintenance  cayenne: cayenne, 0 Refill(s), Type: Maintenance  cycloSPORINE 0.1% ophthalmic emulsion: 0 Refill(s), Type: Maintenance  echinacea: 0 Refill(s), Type: Maintenance  ferrous sulfate: Oral, 0 Refill(s), Type: Maintenance  prednisoLONE acetate 1% ophthalmic suspension: 0 Refill(s), Type: Soft Stop   Problem list:    All Problems (Selected)  Male erectile  dysfunction, unspecified / SNOMED CT 0391199291 / Confirmed  Hyperlipidemia, unspecified / SNOMED CT 70496765 / Confirmed  Hyperlipidemia NOS / SNOMED CT 2543787468 / Confirmed  Fuchs' corneal dystrophy / SNOMED CT 428399847 / Confirmed  Common Peroneal Nerve Dysfunction / SNOMED CT 527138514 / Confirmed  CLL (chronic lymphocytic leukemia) / SNOMED CT 352532853 / Confirmed  Benign prostatic hyperplasia without lower urinary tract symptoms / SNOMED CT 3332436010 / Confirmed  Annual physical exam / SNOMED CT 385397973 / Confirmed      Histories   Past Medical History:    Active  Hyperlipidemia, unspecified (53163476)  Male erectile dysfunction, unspecified (2546655985)  Benign prostatic hyperplasia without lower urinary tract symptoms (7061113125)  Common Peroneal Nerve Dysfunction (875345374)  CLL (chronic lymphocytic leukemia) (133192448)  Fuchs' corneal dystrophy (160424757)  Hyperlipidemia NOS (4740825497)  Resolved  Lumbar disc herniation (736263809): Onset in  at 53 years.  Resolved.  Comments:  2015 CDT 12:11 PM CDT - Ama Gallo  Left L5-S1 with S1 radiculopathy.  Amputation of finger of left hand (427124793): Onset in  at 27 years.  Resolved.  Comments:  2015 CDT 12:15 PM CDT - Ama Gallo.   Family History:    Diabetes mellitus  Son (Reji)  Hypertension  Father ()  Heart disease  Grandmother (M) (General Family Hx)  Diabetes mellitus type I  Daughter (Carly)  Son (Arnie)  Allergic rhinitis  Father ()  CA - Cancer  Brother  Comments:  2013 2:17 PM CDT - Mya Lopez  Recurring lymphoma  Arthritis  Brother  Mother  Stroke  Grandmother (M) (General Family Hx)  Hypercholesterolemia  Mother  Brother  Brother  Father ()  Alzheimer's disease  Mother  Cardiovascular Disease  Mother     Procedure history:    Corneal transplant (193506110) in the month of 2018 at 68 Years.  Comments:  2019 9:35 AM ADRIENNE - Jessica Bui CMA  Right Eye  Microdiscectomy  (391792786) on 11/12/2003 at 53 Years.  Comments:  11/30/2010 4:07 PM CST - Nela Cage  L5-S1  Colonoscopy (361467466) in 2000 at 50 Years.   Social History:        Electronic Cigarette/Vaping Assessment            Electronic Cigarette Use: Never.      Alcohol Assessment: Past            Past, 1-2 times per week, 1 drinks/episode average.  Ready to change: No.                     Comments:                      09/03/2010 - Miguelina AZULN, Melanie                     occ, very little      Tobacco Assessment: Denies Tobacco Use            Never (less than 100 in lifetime)            Never      Substance Abuse Assessment: Denies Substance Abuse            Never      Employment and Education Assessment            Retired      Home and Environment Assessment            Marital status: .  Spouse/Partner name: Shaila.  Living situation: Home/Independent.               Injuries/Abuse/Neglect in household: No.  Feels unsafe at home: No.  Family/Friends available for support:               Yes.      Nutrition and Health Assessment            Type of diet: Regular.  Wants to lose weight: Yes.  Sleeping concerns: No.  Feels highly stressed: Yes.      Exercise and Physical Activity Assessment: Occasional exercise            Exercise frequency: 1-2 times/week.  Exercise type: Walking.      Sexual Assessment            Sexually active: Yes.  Identifies as male, Sexual orientation: Straight or heterosexual.  History of STD: No.               Contraceptive Use Details: None.  History of sexual abuse: No.        Physical Examination   Vital Signs   2/26/2021 7:48 AM CST Peripheral Pulse Rate 77 bpm    HR Method Electronic    Systolic Blood Pressure 129 mmHg    Diastolic Blood Pressure 68 mmHg    Mean Arterial Pressure 88 mmHg    BP Site Right arm    BP Method Electronic      Measurements from flowsheet : Measurements   2/26/2021 7:48 AM CST Height Measured - Standard 73 in    Weight Measured - Standard 221.4 lb    BSA 2.27 m2     Body Mass Index 29.21 kg/m2  HI      Cardiovascular:       Arterial pulses: Bilateral, Posterior tibial, Dorsalis pedis, 1+.    Musculoskeletal:  No deformity, chronic left foot drop.    Integumentary:  Scattered pallor and some petechia mainly on left foot, but some on right foot. No open areas. Purplish discoloration to the toes of the left foot. .       Impression and Plan   Diagnosis     Vasculitis of skin (REZ77-OT L95.9).     Course:  Worsening.    Patient Instructions:       Counseled: Patient, Regarding diagnosis, Regarding medications, Verbalized understanding.    Orders     Orders (Selected)   Outpatient Orders  Ordered  Referral (Request): 02/26/21 8:08:00 CST, Referred to: Vascular Surgery, Referred to: [facility], Reason for referral: Vasculitis, Vasculitis of skin  Prescriptions  Prescribed  predniSONE 10 mg oral tablet: 4 tabs daily x 4, then 3 tabs daily x 4, then 2 tabs daily x 4, then 1 tab daily x 4., Oral, daily, # 40 tab(s), 0 Refill(s), Type: Maintenance, Pharmacy: [facility] #[id number], 4 tabs daily x 4, then 3 tabs daily x 4, then 2 tabs daily x 4, the....     FU for acute worsening..

## 2022-02-15 NOTE — TELEPHONE ENCOUNTER
---------------------  From: Gayathri Solares MA (Phone [employer] ([id number])   To: Bam Arnold PA-C;     Sent: 7/13/2020 2:02:25 PM CDT  Subject: Phone Note: Percocet     PCP:   [facility]      Time of Call:  2:00       Person Calling:  Get  Phone number:  [phone]    Returned call at: _    Note:   Patient called stating you gave him a small supply of Percocet until he could get a MRI. Patient is having MRI this afternoon and is wondering if you could send in more Percocet to get him until we get the MRI results.     [facility]    Last office visit and reason:  7/6/20    Transferred to: KAH---------------------  From: Bam Arnold PA-C   To: Phone Messages Pool ([id number]);     Sent: 7/13/2020 2:04:05 PM CDT  Subject: RE: Phone Note: Percocet     I sent those in    KAH---------------------  From: Gayathri Solares MA (Phone [employer] ([id number]_[employer]))   To: Bam Arnold PA-C;     Sent: 7/13/2020 2:11:04 PM CDT  Subject: RE: Phone Note: Percocet     Patient wants to let you know his mouth and nostrils are dry, tongue is white and little puffy, also has not had BM in 5 days. He is wondering what he should do, the most concerning to him is not having a BM for 5 days. Has not tried any laxatives. Wondering what you would like him to do?---------------------  From: Bam Arnold PA-C   To: Phone Messages Pool ([id number]_[employer]);     Sent: 7/13/2020 2:12:02 PM CDT  Subject: RE: Phone Note: Percocet     I would  some Senokot and or Miralax.    Push fluids.    KAHCalled and let patient know

## 2022-02-15 NOTE — TELEPHONE ENCOUNTER
---------------------  From: Bam Arnold PA-C   To: BIBI SHAW    Sent: 2/4/2019 3:11:39 PM CST    As we discussed.    Results:  Date Result Name Ind Value Ref Range   2/4/2019 11:10 AM Sodium Level  143 mmol/L (135 - 145)   2/4/2019 11:10 AM Potassium Level  4.7 mmol/L (3.5 - 5.0)   2/4/2019 11:10 AM Chloride Level  109 mmol/L (98 - 110)   2/4/2019 11:10 AM CO2 Level  25 mmol/L (21 - 31)   2/4/2019 11:10 AM AGAP  9 (5 - 18)   2/4/2019 11:10 AM Glucose Level ((H)) 105 mg/dL (65 - 100)   2/4/2019 11:10 AM BUN  19 mg/dL (8 - 25)   2/4/2019 11:10 AM Creatinine Level  0.86 mg/dL (0.72 - 1.25)   2/4/2019 11:10 AM Calcium Level  9.8 mg/dL (8.5 - 10.5)   2/4/2019 11:10 AM Bilirubin Total  0.4 mg/dL (0.2 - 1.2)   2/4/2019 11:10 AM Bilirubin Direct  0.2 mg/dL (0.1 - 0.5)   2/4/2019 11:10 AM Bilirubin Indirect  0.2 mg/dL (0.2 - 0.8)   2/4/2019 11:10 AM Alkaline Phosphatase  86 IU/L (50 - 136)   2/4/2019 11:10 AM AST/SGOT  30 IU/L (2 - 40)   2/4/2019 11:10 AM ALT/SGPT  43 IU/L (8 - 45)   2/4/2019 11:10 AM Protein Total  7.0 g/dL (6.0 - 8.0)   2/4/2019 11:10 AM Albumin Level  4.4 g/dL (3.2 - 4.6)   2/4/2019 11:10 AM Globulin  2.6 g/dL (2.0 - 3.7)   2/4/2019 11:10 AM A/G Ratio  1.7 (1.0 - 2.0)   2/4/2019 11:10 AM Troponin I  <0.010 ng/mL ( - <0.034)

## 2022-02-15 NOTE — TELEPHONE ENCOUNTER
---------------------  From: Bam Arnold PA-C   To: BIBI SHAW    Sent: 9/11/2019 7:57:39 AM CDT  Subject: Patient Message - Results Notification        Your white blood cell count is now more elevated. The rest of your labs are stable. These have been forwarded to hematology.    Results:  Date Result Name Ind Value Ref Range   9/5/2019 11:06 AM Sodium Level  140 mmol/L (135 - 146)   9/5/2019 11:06 AM Potassium Level  4.9 mmol/L (3.5 - 5.3)   9/5/2019 11:06 AM Chloride Level  104 mmol/L (98 - 110)   9/5/2019 11:06 AM CO2 Level  25 mmol/L (20 - 32)   9/5/2019 11:06 AM Glucose Level ((H)) 103 mg/dL (65 - 99)   9/5/2019 11:06 AM BUN  16 mg/dL (7 - 25)   9/5/2019 11:06 AM Creatinine Level  1.07 mg/dL (0.70 - 1.25)   9/5/2019 11:06 AM Calcium Level  9.8 mg/dL (8.6 - 10.3)   9/5/2019 11:06 AM Bilirubin Total  0.9 mg/dL (0.2 - 1.2)   9/5/2019 11:06 AM Alkaline Phosphatase  98 unit/L (40 - 115)   9/5/2019 11:06 AM AST/SGOT  22 unit/L (10 - 35)   9/5/2019 11:06 AM ALT/SGPT  28 unit/L (9 - 46)   9/5/2019 11:06 AM LDH  125 unit/L (120 - 250)   9/5/2019 11:06 AM Protein Total  6.9 gm/dL (6.1 - 8.1)   9/5/2019 11:06 AM Albumin Level  4.7 gm/dL (3.6 - 5.1)   9/5/2019 11:06 AM Globulin  2.2 (1.9 - 3.7)   9/5/2019 11:06 AM A/G Ratio  2.1 (1.0 - 2.5)   9/5/2019 11:06 AM Cholesterol  167 mg/dL ( - <200)   9/5/2019 11:06 AM Non-HDL Cholesterol  123 ( - <130)   9/5/2019 11:06 AM HDL  44 mg/dL (>40 - )   9/5/2019 11:06 AM Cholesterol/HDL Ratio  3.8 ( - <5.0)   9/5/2019 11:06 AM LDL  97    9/5/2019 11:06 AM Triglyceride ((H)) 158 mg/dL ( - <150)   9/5/2019 11:06 AM PSA  3.3 ng/mL ( - < OR = 4.0)   9/5/2019 11:06 AM WBC ((H)) 57.3 (3.8 - 10.8)   9/5/2019 11:06 AM RBC  4.57 (4.20 - 5.80)   9/5/2019 11:06 AM Hgb  14.7 gm/dL (13.2 - 17.1)   9/5/2019 11:06 AM Hct  44.4 % (38.5 - 50.0)   9/5/2019 11:06 AM MCV  97.2 fL (80.0 - 100.0)   9/5/2019 11:06 AM MCH  32.2 pg (27.0 - 33.0)   9/5/2019 11:06 AM MCHC  33.1 gm/dL (32.0 - 36.0)    9/5/2019 11:06 AM RDW  12.5 % (11.0 - 15.0)   9/5/2019 11:06 AM Platelet  206 (140 - 400)   9/5/2019 11:06 AM MPV  11.5 fL (7.5 - 12.5)   9/5/2019 11:06 AM Lymphocytes  94.9 %    9/5/2019 11:06 AM Abs Lymphocytes ((H)) 54,378 (850 - 3,900)   9/5/2019 11:06 AM Neutrophils  3.1 %    9/5/2019 11:06 AM Abs Neutrophils  1,776 (1,500 - 7,800)   9/5/2019 11:06 AM Monocytes  2.0 %    9/5/2019 11:06 AM Abs Monocytes ((H)) 1,146 (200 - 950)   9/5/2019 11:06 AM Eosinophils  0 %    9/5/2019 11:06 AM Abs Eosinophils ((L)) 0 (15 - 500)   9/5/2019 11:06 AM Basophils  0 %    9/5/2019 11:06 AM Abs Basophils  0 (0 - 200)   9/5/2019 11:06 AM Differential Comment  Few atypical lymphocytes noted Smudge cells present.    9/5/2019 11:06 AM Differential Comment  See comment

## 2022-02-15 NOTE — NURSING NOTE
Comprehensive Intake Entered On:  7/2/2020 1:31 PM CDT    Performed On:  7/2/2020 1:25 PM CDT by Yvonne Hunt CMA               Summary   Chief Complaint :   c/o left lower back spasm. Not able to sit down and stand up very well   Systolic Blood Pressure :   122 mmHg   Diastolic Blood Pressure :   64 mmHg   Mean Arterial Pressure :   83 mmHg   Peripheral Pulse Rate :   84 bpm   Yvonne Hunt CMA - 7/2/2020 1:25 PM CDT   Health Status   Allergies Verified? :   Yes   Medication History Verified? :   Yes   Pre-Visit Planning Status :   Completed   Yvonne Hunt CMA - 7/2/2020 1:25 PM CDT   Consents   Consent for Immunization Exchange :   Consent Granted   Consent for Immunizations to Providers :   Consent Granted   Yvonne Hunt CMA - 7/2/2020 1:25 PM CDT   Meds / Allergies   (As Of: 7/2/2020 1:32:00 PM CDT)   Allergies (Active)   No Known Medication Allergies  Estimated Onset Date:   Unspecified ; Created By:   Jessica Bui CMA; Reaction Status:   Active ; Category:   Drug ; Substance:   No Known Medication Allergies ; Type:   Allergy ; Updated By:   Jessica Bui CMA; Reviewed Date:   9/30/2019 11:08 AM CDT        Medication List   (As Of: 7/2/2020 1:32:00 PM CDT)   Prescription/Discharge Order    atorvastatin  :   atorvastatin ; Status:   Prescribed ; Ordered As Mnemonic:   Lipitor 40 mg oral tablet ; Simple Display Line:   40 mg, 1 tab(s), PO, Daily, 90 tab(s), 3 Refill(s) ; Ordering Provider:   Bam Arnold PA-C; Catalog Code:   atorvastatin ; Order Dt/Tm:   9/30/2019 11:31:48 AM CDT          finasteride  :   finasteride ; Status:   Prescribed ; Ordered As Mnemonic:   finasteride 5 mg oral tablet ; Simple Display Line:   5 mg, 1 tab(s), PO, Daily, 90 tab(s), 3 Refill(s) ; Ordering Provider:   Bam Arnold PA-C; Catalog Code:   finasteride ; Order Dt/Tm:   9/30/2019 11:31:47 AM CDT            Home Meds    ascorbic acid  :   ascorbic acid ; Status:   Documented ; Ordered As Mnemonic:    Vitamin C ; Simple Display Line:   daily, 0 Refill(s) ; Catalog Code:   ascorbic acid ; Order Dt/Tm:   9/30/2019 11:11:34 AM CDT          calcium-vitamin D  :   calcium-vitamin D ; Status:   Documented ; Ordered As Mnemonic:   calcium (as carbonate)-vitamin D 600 mg-125 intl units oral tablet ; Simple Display Line:   1 tab(s), Oral, tid, 270 tab(s), 0 Refill(s) ; Catalog Code:   calcium-vitamin D ; Order Dt/Tm:   9/30/2019 11:10:43 AM CDT          cyanocobalamin  :   cyanocobalamin ; Status:   Documented ; Ordered As Mnemonic:   Vitamin B-12 ; Simple Display Line:   0 Refill(s) ; Catalog Code:   cyanocobalamin ; Order Dt/Tm:   9/30/2019 11:11:28 AM CDT          cycloSPORINE ophthalmic  :   cycloSPORINE ophthalmic ; Status:   Documented ; Ordered As Mnemonic:   cycloSPORINE 0.1% ophthalmic emulsion ; Simple Display Line:   0 Refill(s) ; Catalog Code:   cycloSPORINE ophthalmic ; Order Dt/Tm:   9/30/2019 11:09:49 AM CDT          echinacea  :   echinacea ; Status:   Documented ; Ordered As Mnemonic:   echinacea ; Simple Display Line:   0 Refill(s) ; Catalog Code:   echinacea ; Order Dt/Tm:   9/30/2019 11:10:11 AM CDT          Miscellaneous Prescription  :   Miscellaneous Prescription ; Status:   Documented ; Ordered As Mnemonic:   cayenne ; Simple Display Line:   0 Refill(s) ; Catalog Code:   Miscellaneous Prescription ; Order Dt/Tm:   9/30/2019 11:11:19 AM CDT          multivitamin  :   multivitamin ; Status:   Documented ; Ordered As Mnemonic:   Multi Vitamin+ ; Simple Display Line:   0 Refill(s) ; Catalog Code:   multivitamin ; Order Dt/Tm:   9/30/2019 11:10:02 AM CDT          omega-3 polyunsaturated fatty acids  :   omega-3 polyunsaturated fatty acids ; Status:   Documented ; Ordered As Mnemonic:   Fish Oil ; Simple Display Line:   Oral, 0 Refill(s) ; Catalog Code:   omega-3 polyunsaturated fatty acids ; Order Dt/Tm:   9/30/2019 11:11:05 AM CDT          prednisoLONE ophthalmic  :   prednisoLONE ophthalmic ; Status:    Documented ; Ordered As Mnemonic:   prednisoLONE acetate 1% ophthalmic suspension ; Simple Display Line:   0 Refill(s) ; Catalog Code:   prednisoLONE ophthalmic ; Order Dt/Tm:   2/4/2019 11:28:33 AM CST ; Comment:   Responsible Provider: ZACH KEENAN            ID Risk Screen   Recent Travel History :   No recent travel   Family Member Travel History :   No recent travel   Other Exposure to Infectious Disease :   Unknown   Yvonne Hunt CMA - 7/2/2020 1:25 PM CDT

## 2022-02-15 NOTE — TELEPHONE ENCOUNTER
---------------------  From: Jazmin Cooper CMA (Phone Messages Pool ([id number]_[facility]))   To: GET SHAW    Sent: 10/29/2020 12:20:40 PM CDT  Subject: RE: My prescription pharmacy     Hello Get,    Sorry about the mix up. I have resent your prescriptions to the [facility] as requested.    Jazmin Chavarria CMA      ---------------------  From: GET SHAW  To: [facility]  Sent: 10/29/2020 12:14 p.m. CDT  Subject: My prescription pharmacy  I saw Bam Arnold yesterday and I think there was a misunderstanding about where to send my prescriptions.  I think they were sent to [facility] and I wanted them sent to [facility] in [place].

## 2022-02-15 NOTE — PROGRESS NOTES
Patient:   BIBI SHAW            MRN: [id number]            FIN: [id number]               Age:   67 years     Sex:  Male     :  1950   Associated Diagnoses:   Amputation of finger of left hand; BPH (Benign Prostatic Hypertrophy); Common Peroneal Nerve Dysfunction; Hyperlipidemia NOS; Lumbar disc herniation; Annual physical exam; Immunization due; Bilateral impacted cerumen   Author:   Bam Arnold PA-C      Visit Information      Date of Service: 2017 03:49 pm  Performing Location: [facility]  Encounter#: [id number]      Primary Care Provider (PCP):  Bam Arnold PA-C    NPI# [id number]      Referring Provider:  Bam Arnold PA-C    NPI# [id number]   Visit type:  Annual exam.    Accompanied by:  No one.    Source of history:  Self.    History limitation:  None.       Chief Complaint   2017 3:58 PM CDT    Physical        Well Adult History   Well Adult History             The patient presents for well adult exam.  The patient's general health status is described as good.  The patient's diet is described as balanced.  Exercise: occasional, 1  times per week, Walks 5 miles per week..  Associated symptoms consist of none.  Additional pertinent history: daily caffeine use, tobacco use none and alcohol use socially.        Review of Systems   Constitutional:  Negative.    Ear/Nose/Mouth/Throat:  Nasal congestion.         Decreased hearing: Bilaterally.    Respiratory:  Negative.    Cardiovascular:  Negative.    Gastrointestinal:  Negative.    Genitourinary:  Nocturia, Urinary frequency, Urinary hesitancy.    Hematology/Lymphatics:  Negative.    Endocrine:  Negative.    Musculoskeletal:  Negative except as documented in history of present illness, Has AFO. Pain has pretty much subsided. Now, just wears brace at work. .    Integumentary:  Negative.    Neurologic:  Negative.    Psychiatric:  Negative.    All other systems reviewed and negative      Health Status   Allergies:    Allergic  Reactions (Selected)  Severity Not Documented  Egg Allergy (No reactions were documented)   Medications:  (Selected)   Prescriptions  Prescribed  Lipitor 40 mg oral tablet: 1 tab(s) ( 40 mg ), PO, Daily, # 90 tab(s), 3 Refill(s), Type: Maintenance, Pharmacy: [id number], 1 tab(s) po daily  finasteride 5 mg oral tablet: 1 tab(s) ( 5 mg ), PO, Daily, # 90 tab(s), 3 Refill(s), Type: Maintenance, Pharmacy: [id number], 1 tab(s) po daily   Problem list:    All Problems  BPH (Benign Prostatic Hypertrophy) / ICD-9-.00 / Confirmed  Ed (Erectile Dysfunction) / ICD-9-.84 / Confirmed  Hyperlipidemia NOS / ICD-9-.4 / Confirmed  Obese / ICD-9-.00 / Probable  Common Peroneal Nerve Dysfunction / SNOMED CT 365265868 / Confirmed  Resolved: Lumbar disc herniation / SNOMED CT 355543257  Resolved: Amputation of finger of left hand / SNOMED CT 614053732      Histories   Past Medical History:    Active  Hyperlipidemia NOS (272.4)  Ed (Erectile Dysfunction) (607.84)  BPH (Benign Prostatic Hypertrophy) (600.00)  Common Peroneal Nerve Dysfunction (087263862)  Obese (278.00)  Resolved  Lumbar disc herniation (238608860): Onset in 2003 at 53 years.  Resolved.  Comments:  9/24/2015 CDT 12:11 PM CDT - Ama Gallo  Left L5-S1 with S1 radiculopathy.  Amputation of finger of left hand (775977157): Onset in 1977 at 27 years.  Resolved.  Comments:  9/24/2015 CDT 12:15 PM CDT - Ama Gallo  Index fiinger.   Family History:    Heart disease  Grandmother (M) (General Family Hx)  Diabetes mellitus type I  Daughter (Carly)  Son (Arnie)  [place] - Cancer  Brother  Comments:  9/19/2013 2:17 PM - Mya Lopez  Recurring lymphoma  Arthritis  Brother  Stroke  Grandmother (M) (General Family Hx)  Hypercholesterolemia  Mother  Brother  Brother  Cardiovascular Disease  Mother     Procedure history:    Microdiscectomy (269109563) on 11/12/2003 at 53 Years.  Comments:  11/30/2010 4:07 PM - Nela Cage  L5-S1  Colonoscopy  (864695518) in 2000 at 50 Years.   Social History:        Alcohol Assessment: Current            Current            2-3 x's per week., 1 drinks/episode average.  2 drinks/episode maximum.      Tobacco Assessment: Denies Tobacco Use            Never      Substance Abuse Assessment: Denies Substance Abuse            Never      Employment and Education Assessment                  Home and Environment Assessment            Marital status: .  Spouse/Partner name: Shaila.      Nutrition and Health Assessment            Type of diet: Regular.      Exercise and Physical Activity Assessment: Does not exercise            Exercise frequency: Never.  Exercise type: Walking.        Physical Examination   Vital Signs   9/13/2017 3:58 PM CDT Temperature Tympanic 98.5 DegF    Peripheral Pulse Rate 76 bpm    Pulse Site Radial artery    HR Method Manual    Systolic Blood Pressure 120 mmHg    Diastolic Blood Pressure 70 mmHg    Mean Arterial Pressure 87 mmHg    BP Site Left arm    BP Method Manual      Measurements from flowsheet : Measurements   9/13/2017 3:58 PM CDT Height Measured - Standard 72.5 in    Weight Measured - Standard 235.6 lb    BSA 2.34 m2    Body Mass Index 31.51 kg/m2      General:  Alert and oriented.    Eye:  Pupils are equal, round and reactive to light, Extraocular movements are intact, Normal conjunctiva.    HENT:  Normocephalic, Oral mucosa is moist, No pharyngeal erythema.         Nose: Both nostrils, Discharge ( Small amount, Clear ).    Neck:  Supple, Non-tender, No lymphadenopathy.    Respiratory:  Lungs are clear to auscultation, Respirations are non-labored, Breath sounds are equal.    Cardiovascular:  Normal rate, Regular rhythm, No murmur.    Gastrointestinal:  Soft, Non-tender, Non-distended, Normal bowel sounds, No organomegaly.    Genitourinary:  No costovertebral angle tenderness.    Musculoskeletal:  Chronic atrophy of lower leg and weakness. Left foot drop. Weak  dorsiflexors..    Psychiatric:  Cooperative, Appropriate mood & affect.       Health Maintenance      Recommendations     Pending (in the next year)        OverDue           Colorectal Cancer Screen (Colonoscopy) (Male) due  05/28/10  and every 10  year(s)           Colorectal Cancer Screen (Occult Blood) (Male) due  05/28/10  and every 10  year(s)           Colorectal Cancer Screen (Sigmoidoscopy) (Male) due  05/28/10  and every 10  year(s)           Tetanus Vaccine due  06/06/15  and every 10  year(s)        Due            Abdominal Aortic Aneurysm Screen (if hx of smoking) due  09/13/17  One-time only           Aspirin Therapy for Prevention of CVD (Male) due  09/13/17  and every 5  year(s)           Fall Risk Screen (Male) due  09/13/17  and every 1  year(s)           HIV Screen (if sexually active) (Male) due  09/13/17  and every 1  year(s)           Lung Cancer Screen (Male) due  09/13/17  and every 1  year(s)           STD Counseling (if sexually active) (Male) due  09/13/17  and every 1  year(s)           Syphilis Screen (if sexually active) (Male) due  09/13/17  and every 1  year(s)        Near Due            Lipid Disorders Screen (Male) near due  09/15/17  and every 1  year(s)           Alcohol Misuse Screen (Male) near due  09/15/17  and every 1  year(s)           Depression Screen (Male) near due  09/15/17  and every 1  year(s)     Satisfied (in the past 1 year)        Satisfied            Alcohol Misuse Screen (Male) on  09/15/16.           Body Mass Index Check (Male) on  09/13/17.           Body Mass Index Check (Male) on  09/15/16.           Depression Screen (Male) on  09/15/16.           Depression Screen (Male) on  09/15/16.           Depression Screen (Male) on  09/15/16.           High Blood Pressure Screen (Male) on  09/13/17.           High Blood Pressure Screen (Male) on  04/07/17.           High Blood Pressure Screen (Male) on  11/28/16.           High Blood Pressure Screen (Male) on   09/15/16.           Influenza Vaccine on  09/13/17.           Influenza Vaccine on  11/28/16.           Lipid Disorders Screen (Male) on  09/15/16.           Lipid Disorders Screen (Male) on  09/15/16.           Lipid Disorders Screen (Male) on  09/15/16.           Lipid Disorders Screen (Male) on  09/15/16.           Obesity Screen and Counseling (Male) on  09/13/17.           Obesity Screen and Counseling (Male) on  11/28/16.           Obesity Screen and Counseling (Male) on  09/15/16.           Pneumococcal Vaccine on  09/13/17.           Tobacco Use Screen (Male) on  09/13/17.           Tobacco Use Screen (Male) on  04/07/17.           Tobacco Use Screen (Male) on  11/28/16.           Tobacco Use Screen (Male) on  09/15/16.          Procedure   Ear foreign body removal procedure   Date/ Time:  9/13/2017 4:29:00 PM.     Confirmed: patient, procedure, side.     Performed by: self.     Indication: ear fullness.     Location: left ear, right ear.     Preparation and technique: positioned sitting upright, method including (cerumen loop, irrigation not with warm tap water).     Procedure tolerated: well.     Complications: None.        Impression and Plan   Diagnosis     Amputation of finger of left hand (KHO89-BZ S68.119A).     BPH (Benign Prostatic Hypertrophy) (VOM74-PA N40.0).     Common Peroneal Nerve Dysfunction (PSJ63-AQ S84.12XA).     Hyperlipidemia NOS (ZTG64-VP E78.5).     Lumbar disc herniation (NIZ78-VQ M51.26).     Annual physical exam (AYA15-XA Z00.00).     Immunization due (XWZ57-DS Z23).     Bilateral impacted cerumen (RVB53-NX H61.23).     Patient Instructions:       Counseled: Patient, Regarding diagnosis, Regarding medications, Verbalized understanding.    Orders     Orders (Selected)   Outpatient Orders  Ordered (Dispatched)  Basic Metabolic Panel* ([facility]): Specimen Type: Serum, Collection Date: 09/13/17 16:08:00 CDT  Hemoglobin A1c* ([facility]): Specimen Type: Blood, Collection Date: 09/13/17 16:08:00  CDT  Lipid panel with reflex to direct ldl* ([facility]): Specimen Type: Serum, Collection Date: 09/13/17 16:08:00 CDT  PSA, Total (Room)* ([facility]): Specimen Type: Serum, Collection Date: 09/13/17 16:08:00 CDT.     RTC in one year and PRN. If cholesterol controlled will continue same medications, if needed will change.

## 2022-02-15 NOTE — NURSING NOTE
CAGE Assessment Entered On:  10/28/2020 2:36 PM CDT    Performed On:  10/28/2020 2:36 PM CDT by Sunshine Harp CMA               Assessment   Have people annoyed you by criticizing your drinking :   No   Have you ever felt bad or guilty about your drinking :   No   Have you ever taken a drink first thing in the morning to steady your nerves or get rid of a hangover (Eye-opener) :   No   Sunshine Harp CMA - 10/28/2020 2:36 PM CDT

## 2022-02-15 NOTE — NURSING NOTE
Comprehensive Intake Entered On:  9/15/2020 11:28 AM CDT    Performed On:  9/15/2020 11:27 AM CDT by Schoenike , Andrea               Summary   Chief Complaint :   Labs and vitals   Height Measured :   73 in(Converted to: 6 ft 1 in, 185.42 cm)    Systolic Blood Pressure :   123 mmHg   Diastolic Blood Pressure :   68 mmHg   Mean Arterial Pressure :   86 mmHg   Peripheral Pulse Rate :   72 bpm   BP Site :   Left arm   Pulse Site :   Radial artery   BP Method :   Electronic   HR Method :   Electronic   Oxygen Saturation :   96 %   Schoenike , Andrea - 9/15/2020 11:27 AM CDT   ID Risk Screen   Recent Travel History :   No recent travel   Family Member Travel History :   No recent travel   Other Exposure to Infectious Disease :   Unknown   Schoenike , Andrea - 9/15/2020 11:27 AM CDT

## 2022-02-15 NOTE — TELEPHONE ENCOUNTER
Received call from [facility] Has sub acute L1 compression fracture. Discussed options. He will go to [facility]  in [place].    KAH

## 2022-02-15 NOTE — PROGRESS NOTES
Patient:   BIBI SHAW            MRN: [id number]            FIN: [id number]               Age:   68 years     Sex:  Male     :  1950   Associated Diagnoses:   Dizziness; Fuchs' corneal dystrophy   Author:   Bam Arnold PA-C      Report Summary   Diagnosis  Dizziness (HWC02-GB R42).  Patient Instructions   Visit Information   Visit type:  General concerns.    Accompanied by:  No one.    Source of history:  Self, Medical record.    Referral source:  Self.    History limitation:  None.       Chief Complaint   2019 10:44 AM CST    not feeling well, sweat, feels lightheaded        History of Present Illness             The patient presents with lightheaded.  Associated symptoms consist of blurred vision, nausea, denies chest pain, denies chills, denies fever and denies double vision.  Lightheaded, nauseated and diaphoretic this AM. No chest pain. Has eye surgery last week. Feels unsteady. No fever or chills. No vertigo. Did not eat this AM. CC above noted and confirmed with the patient..        Review of Systems   Constitutional:  Negative.    Eye:  Recent visual problem.    Ear/Nose/Mouth/Throat:  Negative.    Respiratory:  Negative.    Cardiovascular:  Negative.    Neurologic:  Negative except as documented in history of present illness.       Health Status   Allergies:    Allergic Reactions (All)  No Known Medication Allergies  Canceled/Inactive Reactions (All)  Severity Not Documented  Egg Allergy (No reactions were documented)  No known allergies   Medications:  (Selected)   Prescriptions  Prescribed  Lipitor 40 mg oral tablet: = 1 tab(s) ( 40 mg ), PO, Daily, # 90 tab(s), 3 Refill(s), Type: Maintenance, Pharmacy: [facility] #[id number], 1 tab(s) Oral daily  finasteride 5 mg oral tablet: = 1 tab(s) ( 5 mg ), PO, Daily, # 90 tab(s), 3 Refill(s), Type: Maintenance, Pharmacy: [facility] #[id number], 1 tab(s) Oral daily   Problem list:    All Problems (Selected)  Obese / ICD-9-.00 /  Probable  Hyperlipidemia NOS / ICD-9-.4 / Confirmed  Fuchs' corneal dystrophy / SNOMED CT 166849035 / Confirmed  Ed (Erectile Dysfunction) / ICD-9-.84 / Confirmed  Common Peroneal Nerve Dysfunction / SNOMED CT 955452814 / Confirmed  CLL (chronic lymphocytic leukemia) / SNOMED CT 764980428 / Confirmed  BPH (Benign Prostatic Hypertrophy) / ICD-9-.00 / Confirmed      Histories   Past Medical History:    Active  Hyperlipidemia NOS (272.4)  Ed (Erectile Dysfunction) (607.84)  BPH (Benign Prostatic Hypertrophy) (600.00)  Common Peroneal Nerve Dysfunction (883906860)  Obese (278.00)  Resolved  Lumbar disc herniation (129257321): Onset in 2003 at 53 years.  Resolved.  Comments:  9/24/2015 CDT 12:11 PM CDT - Ama Gallo  Left L5-S1 with S1 radiculopathy.  Amputation of finger of left hand (993571220): Onset in 1977 at 27 years.  Resolved.  Comments:  9/24/2015 CDT 12:15 PM CDT - Ama Gallo.   Family History:    Diabetes mellitus  Son (Reji)  Heart disease  Grandmother (M) (General Family Hx)  Diabetes mellitus type I  Daughter (Carly)  Son (Arnie)  CA - Cancer  Brother  Comments:  9/19/2013 2:17 PM CDT - Mya Lopez  Recurring lymphoma  Arthritis  Brother  Stroke  Grandmother (M) (General Family Hx)  Hypercholesterolemia  Mother  Brother  Brother  Cardiovascular Disease  Mother     Procedure history:    Corneal transplant ([id number]) in the month of 11/2018 at 68 Years.  Comments:  1/17/2019 9:35 AM CST - Jessica Bui CMA  Right Eye  Microdiscectomy ([id number]) on 11/12/2003 at 53 Years.  Comments:  11/30/2010 4:07 PM CST - Nela Cage  L5-S1  Colonoscopy (678706520) in 2000 at 50 Years.      Physical Examination   Vital Signs   2/4/2019 10:44 AM CST Temperature Tympanic 97.6 DegF  LOW    Peripheral Pulse Rate 73 bpm    HR Method Electronic    Systolic Blood Pressure 130 mmHg    Diastolic Blood Pressure 82 mmHg  HI    Mean Arterial Pressure 98 mmHg    BP Site Left arm    BP Method  Manual    Oxygen Saturation 99 %      General:  Alert and oriented, No acute distress.    HENT:  Normocephalic, Tympanic membranes are clear, Oral mucosa is moist, No pharyngeal erythema.    Neck:  Supple, Non-tender, No lymphadenopathy.    Respiratory:  Lungs are clear to auscultation, Respirations are non-labored.    Cardiovascular:  Normal rate, Regular rhythm.    Integumentary:  No rash.    Neurologic:  No focal deficits.       Review / Management   ECG interpretation:  Within normal limits, Normal sinus rhythm.       Impression and Plan   Diagnosis     Dizziness (EAX19-WX R42).     Fuchs' corneal dystrophy (GWW03-TH H18.51).     Patient Instructions:       Counseled: Patient, Regarding diagnosis, Diet, Activity.    Discussed going to ED for more real time assessment. He declines. Stat labs pending. Go home and eat and drink something. Will call this PM with labs. To ED if acute worsening.

## 2022-02-15 NOTE — PROGRESS NOTES
Patient:   BIBI SHAW            MRN: [id number]            FIN: [id number]               Age:   68 years     Sex:  Male     :  1950   Associated Diagnoses:   Fuchs' corneal dystrophy; Pre-op exam; Hyperlipidemia NOS; Lumbar disc herniation; BPH (Benign Prostatic Hypertrophy); CLL (chronic lymphocytic leukemia)   Author:   Bam Arnold PA-C      Report Summary   Diagnosis  Fuchs' corneal dystrophy (SGV97-PC H18.51).  Condition   Preoperative Information   Gradual vision change. Needs corneal transplant      Chief Complaint   2019 9:31 AM CST    Preop: Corneal Transplant of the left eye with Dr. Gomez at [facility] in [place] on 19     Updated from . No change in history. Right eye surgery went excellently.      Review of Systems   Constitutional:  Negative.    Eye:  Visual disturbances.    Ear/Nose/Mouth/Throat:  Negative.    Respiratory:  Negative.    Cardiovascular:  Negative.    Gastrointestinal:  Negative.    Genitourinary:  Negative.    Hematology/Lymphatics:  Negative.    Immunologic:  Negative.    Musculoskeletal:       Back pain: Bilaterally, In the lower region.    Integumentary:  Negative.    Psychiatric:  Negative.       Health Status   Allergies:    Allergic Reactions (All)  No Known Medication Allergies  Canceled/Inactive Reactions (All)  Severity Not Documented  Egg Allergy (No reactions were documented)  No known allergies   Medications:  (Selected)   Prescriptions  Prescribed  Lipitor 40 mg oral tablet: = 1 tab(s) ( 40 mg ), PO, Daily, # 90 tab(s), 3 Refill(s), Type: Maintenance, Pharmacy: [facility] #[id number], 1 tab(s) Oral daily  finasteride 5 mg oral tablet: = 1 tab(s) ( 5 mg ), PO, Daily, # 90 tab(s), 3 Refill(s), Type: Maintenance, Pharmacy: [facility] #[id number], 1 tab(s) Oral daily   Problem list:    All Problems (Selected)  Obese / ICD-9-.00 / Probable  Hyperlipidemia NOS / ICD-9-.4 / Confirmed  Fuchs' corneal dystrophy / SNOMED CT 912340524 /  Confirmed  Ed (Erectile Dysfunction) / ICD-9-.84 / Confirmed  Common Peroneal Nerve Dysfunction / SNOMED CT 132355805 / Confirmed  CLL (chronic lymphocytic leukemia) / SNOMED CT 154832178 / Confirmed  BPH (Benign Prostatic Hypertrophy) / ICD-9-.00 / Confirmed      Histories   Past Medical History:    Active  Hyperlipidemia NOS (272.4)  Ed (Erectile Dysfunction) (607.84)  BPH (Benign Prostatic Hypertrophy) (600.00)  Common Peroneal Nerve Dysfunction (430383162)  Obese (278.00)  Resolved  Lumbar disc herniation (009049465): Onset in 2003 at 53 years.  Resolved.  Comments:  9/24/2015 CDT 12:11 PM CDT - Ama Gallo  Left L5-S1 with S1 radiculopathy.  Amputation of finger of left hand ([id number]): Onset in 1977 at 27 years.  Resolved.  Comments:  9/24/2015 CDT 12:15 PM CDT - Ama Gallo  Index fiinger., No PMH of anesthesia or bleeding problems, No DEBORA   Family History:    Diabetes mellitus  Son (Reji)  Heart disease  Grandmother (M) (General Family Hx)  Diabetes mellitus type I  Daughter (Carly)  Son (Arine)  [place] - Cancer  Brother  Comments:  9/19/2013 2:17 PM CDT - Jessica  Mya  Recurring lymphoma  Arthritis  Brother  Stroke  Grandmother (M) (General Family Hx)  Hypercholesterolemia  Mother  Brother  Brother  Cardiovascular Disease  Mother  , No FH of bleeding or anesthesia problems.   Procedure history:    Corneal transplant ([id number]) in the month of 11/2018 at 68 Years.  Comments:  1/17/2019 9:35 AM CST - Jessica Bui CMA  Right Eye  Microdiscectomy ([id number]) on 11/12/2003 at 53 Years.  Comments:  11/30/2010 4:07 PM CST - Nela Cage  L5-S1  Colonoscopy ([id number]) in 2000 at 50 Years.   Social History:        Alcohol Assessment: Past            Past                     Comments:                      09/03/2010 - Melanie Mcintyre LPN                     occ, very little      Tobacco Assessment: Denies Tobacco Use            Never      Substance Abuse Assessment: Denies Substance Abuse             Never      Employment and Education Assessment            Retired      Home and Environment Assessment            Marital status: .  Spouse/Partner name: Shaila.      Nutrition and Health Assessment            Type of diet: Regular.      Exercise and Physical Activity Assessment: Regular exercise            Exercise frequency: 5-6 times/week.  Exercise type: Walking.      Sexual Assessment            Sexually active: Yes.  Sexual orientation: Straight or heterosexual.      Physical Examination   Vital Signs   1/17/2019 9:31 AM CST Temperature Tympanic 95.9 DegF  LOW    Peripheral Pulse Rate 80 bpm    Pulse Site Radial artery    HR Method Manual    Systolic Blood Pressure 122 mmHg    Diastolic Blood Pressure 80 mmHg    Mean Arterial Pressure 94 mmHg    BP Site Right arm    BP Method Manual      Measurements from flowsheet : Measurements   1/17/2019 9:31 AM CST Height Measured - Standard 73 in    Weight Measured - Standard 217 lb    BSA 2.25 m2    Body Mass Index 28.63 kg/m2  HI      General:  Alert and oriented.    Eye:  Normal conjunctiva.    HENT:  Normocephalic, Tympanic membranes are clear, Oral mucosa is moist, No pharyngeal erythema.    Neck:  Supple, Non-tender, No lymphadenopathy.    Respiratory:  Lungs are clear to auscultation, Respirations are non-labored, Breath sounds are equal.    Cardiovascular:  Normal rate, Regular rhythm, No murmur.    Gastrointestinal:  Soft, Non-tender, Non-distended, Normal bowel sounds, No organomegaly.    Genitourinary:  No costovertebral angle tenderness.    Integumentary:  No rash.    Neurologic:  Peroneal nerve dysfunction.       Impression and Plan   Diagnosis     Fuchs' corneal dystrophy (UVY31-RT H18.51).     Pre-op exam (BMT08-SN Z01.818).     Hyperlipidemia NOS (ALH05-KV E78.5).     Lumbar disc herniation (ITV27-XP M51.26).     BPH (Benign Prostatic Hypertrophy) (YZR96-DO N40.0).     CLL (chronic lymphocytic leukemia) (MQX20-HE C91.90).     Condition:  Stable.     No known contraindications to the planned surgical procedure.

## 2022-02-15 NOTE — PROGRESS NOTES
Patient:   BIBI SHAW            MRN: [id number]            FIN: [id number]               Age:   70 years     Sex:  Male     :  1950   Associated Diagnoses:   Ankle edema; Superficial thrombophlebitis of left leg   Author:   Duncan BLOCK, Gilberto PRATT      Chief Complaint   2020 12:49 PM CDT   Pt here for left foot/ankle edema and pain that radiates up into leg x 2-3 weeks        History of Present Illness   Chief complaint and symptoms noted above and confirmed with patient   has hx of degenerative disc disease, also has L1 compression fracture  saw [facility] spine specialist 9 days ago for steroid injection at L5, he was seeing some improvement  until a few days ago when he tried to raise his left leg high  he will return to [facility] on 8/3 for follow up  he is taking percocet and cyclobenzaprine      has long standing (17 years) left foot drop    has had 2-3 week hx of left foot/ankle edema, now has pain that radiates up his leg for the past few days               Review of Systems   Constitutional:  Negative.    Ear/Nose/Mouth/Throat:  Negative.    Respiratory:  Negative.       Health Status   Allergies:    Allergic Reactions (Selected)  No Known Medication Allergies   Medications:  (Selected)   Prescriptions  Prescribed  Lipitor 40 mg oral tablet: = 1 tab(s) ( 40 mg ), PO, Daily, # 90 tab(s), 3 Refill(s), Type: Maintenance, Pharmacy: [facility] [id number] [facility], 1 tab(s) Oral daily  Percocet 5/325 oral tablet: 1 tab(s), Oral, q4 hrs, PRN: for pain, # 30 tab(s), 0 Refill(s), Type: Acute, Pharmacy: [facility] #[id number], 1 tab(s) Oral q4 hrs,PRN:for pain, 73, in, 19 10:57:00 CDT, Height Measured, 224, lb, 20 11:33:00 CDT, Weight Measured  cyclobenzaprine 10 mg oral tablet: = 1 tab(s) ( 10 mg ), Oral, tid, PRN: for spasm, # 30 tab(s), 0 Refill(s), Type: Maintenance, Pharmacy: [facility] #[id number], 1 tab(s) Oral tid,PRN:for spasm, 73, in, 19 10:57:00 CDT, Height Measured, 224, lb, 20  11:33:00 CDT, Weigh...  finasteride 5 mg oral tablet: = 1 tab(s) ( 5 mg ), PO, Daily, # 90 tab(s), 3 Refill(s), Type: Maintenance, Pharmacy: [id number], 1 tab(s) Oral daily  Documented Medications  Documented  Fish Oil: Oral, 0 Refill(s), Type: Maintenance  Multi Vitamin+: 0 Refill(s), Type: Maintenance  Vitamin B-12: 0 Refill(s), Type: Maintenance  Vitamin C: daily, 0 Refill(s), Type: Maintenance  acyclovir 800 mg oral tablet: = 1 tab(s) ( 800 mg ), Oral, daily, 0 Refill(s), Type: Maintenance  calcium (as carbonate)-vitamin D 600 mg-125 intl units oral tablet: 1 tab(s), Oral, tid, # 270 tab(s), 0 Refill(s), Type: Maintenance  cayenne: cayenne, 0 Refill(s), Type: Maintenance  cycloSPORINE 0.1% ophthalmic emulsion: 0 Refill(s), Type: Maintenance  echinacea: 0 Refill(s), Type: Maintenance  prednisoLONE acetate 1% ophthalmic suspension: 0 Refill(s), Type: Soft Stop   Problem list:    All Problems (Selected)  Common Peroneal Nerve Dysfunction / SNOMED CT 297142576 / Confirmed  Hyperlipidemia NOS / ICD-9-.4 / Confirmed  Obese / ICD-9-.00 / Probable  Fuchs' corneal dystrophy / SNOMED CT 482417693 / Confirmed  CLL (chronic lymphocytic leukemia) / SNOMED CT 119724850 / Confirmed  BPH (Benign Prostatic Hypertrophy) / ICD-9-.00 / Confirmed  Ed (Erectile Dysfunction) / ICD-9-.84 / Confirmed      Histories   Past Medical History:    Active  Hyperlipidemia NOS (272.4)  Ed (Erectile Dysfunction) (607.84)  BPH (Benign Prostatic Hypertrophy) (600.00)  Common Peroneal Nerve Dysfunction (931568798)  Obese (278.00)  CLL (chronic lymphocytic leukemia) (178385439)  Fuchs' corneal dystrophy (513072731)  Resolved  Lumbar disc herniation (242188766): Onset in 2003 at 53 years.  Resolved.  Comments:  9/24/2015 CDT 12:11 PM CDT - Ama Gallo  Left L5-S1 with S1 radiculopathy.  Amputation of finger of left hand (573614883): Onset in 1977 at 27 years.  Resolved.  Comments:  9/24/2015 CDT 12:15 PM CDT - Sabino  Ama  Index fiinger.   Family History:    Diabetes mellitus  Son (Reji)  Hypertension  Father ()  Heart disease  Grandmother (M) (General Family Hx)  Diabetes mellitus type I  Daughter (Carly)  Son (Arnie)  Allergic rhinitis  Father ()  CA - Cancer  Brother  Comments:  2013 2:17 PM CDT - Mya Lopez  Recurring lymphoma  Arthritis  Brother  Mother  Stroke  Grandmother (M) (General Family Hx)  Hypercholesterolemia  Mother  Brother  Brother  Father ()  Alzheimer's disease  Mother  Cardiovascular Disease  Mother     Procedure history:    Corneal transplant ([id number]) in the month of 2018 at 68 Years.  Comments:  2019 9:35 AM CST - Jessica Bui CMA  Right Eye  Microdiscectomy ([id number]) on 2003 at 53 Years.  Comments:  2010 4:07 PM CST - Nela Cage  L5-S1  Colonoscopy ([id number]) in  at 50 Years.      Physical Examination   Vital Signs   2020 12:49 PM CDT Temperature Tympanic 98.9 DegF    Peripheral Pulse Rate 72 bpm    Pulse Site Radial artery    Respiratory Rate 16 br/min    Systolic Blood Pressure 118 mmHg    Diastolic Blood Pressure 72 mmHg    Mean Arterial Pressure 87 mmHg    BP Site Right arm      Measurements from flowsheet : Measurements   2020 12:49 PM CDT Height Measured - Standard 73 in    Weight Measured - Standard 222 lb    BSA 2.28 m2    Body Mass Index 29.29 kg/m2  HI      General:  No acute distress.    Musculoskeletal:  mild swelling around left medial and lateral maleoli,  he has long standing foot drop with weakness (absent dorsi flexion, some strength with resisted plantar flexion), there is tenderness and swelling of a superficial vessel just above the medial maleolus  calf is soft and non tender.       Impression and Plan   Diagnosis     Ankle edema (BYK64-UR M25.473).     Superficial thrombophlebitis of left leg (ZPL56-KE I80.02).     Summary:  left ankle swelling probably due to superficial thrombophlebitis of left lower leg,  will treat with compression (Ace wrap) and gentle heat  he is already on percocet and cyclobenzaprine for back pain  follow up with [facility] spine center as previously scheduled on 8/3.    Orders     Orders   Charges (Evaluation and Management):  73232 office outpatient visit 15 minutes (Charge) (Order): Quantity: 1, Ankle edema  Superficial thrombophlebitis of left leg.   117

## 2022-02-15 NOTE — TELEPHONE ENCOUNTER
---------------------  From: Gayathri Solares MA (Phone Messages Pool ([id number]_[facility]))   To: Appointment Pool ([id number]_[facility]);     Sent: 2019 4:31:14 PM CST  Subject: RE: Appointment Request     We already have the order, its a standing order in his chart.       ---------------------  From: Tarah Carvajal (Appointment Pool ([id number]_[facility]))   To: Phone Messages Pool ([id number]_[facility]);     Sent: 2019 4:23:54 PM CST  Subject: FW: Appointment Request       Do we have the order for this before we call and setup an appt. for this patient?    ---------------------  From: BIBI SHAW  To: [facility]  Sent: 2019 12:18 p.m. CST  Subject: Appointment Request  Patient Name: BIBI SHAW  Patient : 1950  Appointment Dates: Between 2020 and 2020  Preferred Days: Monday  Preferred Time: 11AM  Contact Patient by: Secure Message    Reason for Visit:    Blood test as ordered by Dr Butler---------------------  From: Tarah Carvajal (Appointment Pool ([id number]_[facility]))   To: Phone Messages Pool ([id number]_[facility]);     Sent: 2019 7:50:38 AM CST  Subject: RE: Appointment Request     THANKS FOR CHECKING

## 2022-02-15 NOTE — PROGRESS NOTES
Patient:   BIBI SHAW            MRN: [id number]            FIN: [id number]               Age:   68 years     Sex:  Male     :  1950   Associated Diagnoses:   Pre-op exam; Fuchs' corneal dystrophy; CLL (chronic lymphocytic leukemia); BPH (Benign Prostatic Hypertrophy)   Author:   Bam Arnold PA-C      Preoperative Information   Gradual vision change. Needs corneal transplant      Review of Systems   Constitutional:  Negative.    Eye:  Visual disturbances.    Ear/Nose/Mouth/Throat:  Negative.    Respiratory:  Negative.    Cardiovascular:  Negative.    Gastrointestinal:  Negative.    Genitourinary:  Negative.    Hematology/Lymphatics:  Negative.    Immunologic:  Negative.    Musculoskeletal:       Back pain: Bilaterally, In the lower region.    Integumentary:  Negative.    Psychiatric:  Negative.       Histories   Past Medical History:    Active  Common Peroneal Nerve Dysfunction (580809489)  Obese (278.00)  Hyperlipidemia NOS (272.4)  Ed (Erectile Dysfunction) (607.84)  BPH (Benign Prostatic Hypertrophy) (600.00)  Resolved  Lumbar disc herniation (233357844): Onset in  at 53 years.  Resolved.  Comments:  2015 CDT 12:11 PM CDT - Ama Gallo  Left L5-S1 with S1 radiculopathy.  Amputation of finger of left hand ([id number]): Onset in  at 27 years.  Resolved.  Comments:  2015 CDT 12:15 PM CDTONY - Ama Gallo  Index fiinger., No PMH of anesthesia or bleeding problems, No DEBORA   Family History: No FH of bleeding or anesthesia problems.   Procedure history:    Microdiscectomy (953883449) on 2003 at 53 Years.  Comments:  2010 4:07 PM - Nela Cage  L5-S1  Colonoscopy ([id number]) in  at 50 Years.      Physical Examination   General:  Alert and oriented.    Eye:  Normal conjunctiva.    HENT:  Normocephalic, Tympanic membranes are clear, Oral mucosa is moist, No pharyngeal erythema.    Neck:  Supple, Non-tender, No lymphadenopathy.    Respiratory:  Lungs are clear to auscultation,  Respirations are non-labored, Breath sounds are equal.    Cardiovascular:  Normal rate, Regular rhythm, No murmur.    Gastrointestinal:  Soft, Non-tender, Non-distended, Normal bowel sounds, No organomegaly.    Genitourinary:  No costovertebral angle tenderness.    Integumentary:  No rash.    Neurologic:  Peroneal nerve dysfunction.       Impression and Plan   Diagnosis     Pre-op exam (NWJ95-SZ Z01.818).     Fuchs' corneal dystrophy (JJV10-YW H18.51).     CLL (chronic lymphocytic leukemia) (BPU58-ZN C91.90).     BPH (Benign Prostatic Hypertrophy) (GEI31-NS N40.0).     Condition:  Stable.    No known contraindications to the planned surgical procedure.

## 2022-02-15 NOTE — NURSING NOTE
Medicare Visit Entered On:  10/28/2020 2:05 PM CDT    Performed On:  10/28/2020 1:58 PM CDT by Sunshine Harp CMA               Summary   Chief Complaint :   AWV   Weight Measured :   223.8 lb(Converted to: 223 lb 13 oz, 101.514 kg)    Height Measured :   73 in(Converted to: 6 ft 1 in, 185.42 cm)    Body Mass Index :   29.52 kg/m2 (HI)    Body Surface Area :   2.28 m2   Systolic Blood Pressure :   136 mmHg (HI)    Diastolic Blood Pressure :   76 mmHg   Mean Arterial Pressure :   96 mmHg   Peripheral Pulse Rate :   75 bpm   BP Site :   Right arm   BP Method :   Electronic   HR Method :   Electronic   Sunshine Harp CMA - 10/28/2020 1:58 PM CDT   Health Status   Allergies Verified? :   Yes   Medication History Verified? :   Yes   Medical History Verified? :   Yes   Pre-Visit Planning Status :   Completed   Tobacco Use? :   Never smoker   Sunshine Harp CMA - 10/28/2020 1:58 PM CDT   Consents   Consent for Immunization Exchange :   Consent Granted   Consent for Immunizations to Providers :   Consent Granted   Sunshine Harp CMA - 10/28/2020 1:58 PM CDT   Procedures / Surgeries        -    Procedure History   (As Of: 10/28/2020 2:05:43 PM CDT)     Procedure Dt/Tm:   2000 ; Anesthesia Minutes:   0 ; Procedure Name:   Colonoscopy ; Procedure Minutes:   0 ; Last Reviewed Dt/Tm:   10/28/2020 2:00:45 PM CDT            Procedure Dt/Tm:   11/12/2003 ; Anesthesia Minutes:   0 ; Procedure Name:   Microdiscectomy ; Procedure Minutes:   0 ; Comments:     11/30/2010 4:07 PM Nela West  L5-S1 ; Last Reviewed Dt/Tm:   10/28/2020 2:00:45 PM CDT            Procedure Dt/Tm:   11/2018 ; Anesthesia Minutes:   0 ; Procedure Name:   Corneal transplant ; Procedure Minutes:   0 ; Comments:     1/17/2019 9:35 AM Jessica Salmeron CMA  Right Eye ; Last Reviewed Dt/Tm:   10/28/2020 2:00:45 PM CDT            Meds / Allergies   (As Of: 10/28/2020 2:05:43 PM CDT)   Allergies (Active)   No Known Medication Allergies  Estimated Onset Date:    Unspecified ; Created By:   Jessica Bui CMA; Reaction Status:   Active ; Category:   Drug ; Substance:   No Known Medication Allergies ; Type:   Allergy ; Updated By:   Jessica Bui CMA; Reviewed Date:   10/28/2020 2:00 PM CDT        Medication List   (As Of: 10/28/2020 2:05:43 PM CDT)   Prescription/Discharge Order    atorvastatin  :   atorvastatin ; Status:   Prescribed ; Ordered As Mnemonic:   Lipitor 40 mg oral tablet ; Simple Display Line:   40 mg, 1 tab(s), PO, Daily, 90 tab(s), 3 Refill(s) ; Ordering Provider:   Bam Arnold PA-C; Catalog Code:   atorvastatin ; Order Dt/Tm:   9/30/2019 11:31:48 AM CDT          cyclobenzaprine  :   cyclobenzaprine ; Status:   Prescribed ; Ordered As Mnemonic:   cyclobenzaprine 10 mg oral tablet ; Simple Display Line:   10 mg, 1 tab(s), Oral, tid, PRN: for spasm, 30 tab(s), 0 Refill(s) ; Ordering Provider:   Bam Arnold PA-C; Catalog Code:   cyclobenzaprine ; Order Dt/Tm:   7/28/2020 1:07:28 PM CDT          finasteride  :   finasteride ; Status:   Prescribed ; Ordered As Mnemonic:   finasteride 5 mg oral tablet ; Simple Display Line:   5 mg, 1 tab(s), PO, Daily, 90 tab(s), 3 Refill(s) ; Ordering Provider:   Bam Arnold PA-C; Catalog Code:   finasteride ; Order Dt/Tm:   9/30/2019 11:31:47 AM CDT            Home Meds    acyclovir  :   acyclovir ; Status:   Documented ; Ordered As Mnemonic:   acyclovir 800 mg oral tablet ; Simple Display Line:   800 mg, 1 tab(s), Oral, daily, 0 Refill(s) ; Catalog Code:   acyclovir ; Order Dt/Tm:   7/6/2020 8:36:33 AM CDT          ascorbic acid  :   ascorbic acid ; Status:   Documented ; Ordered As Mnemonic:   Vitamin C ; Simple Display Line:   daily, 0 Refill(s) ; Catalog Code:   ascorbic acid ; Order Dt/Tm:   9/30/2019 11:11:34 AM CDT          calcium-vitamin D  :   calcium-vitamin D ; Status:   Documented ; Ordered As Mnemonic:   calcium (as carbonate)-vitamin D 600 mg-125 intl units oral tablet ; Simple Display Line:   1 tab(s), Oral,  tid, 270 tab(s), 0 Refill(s) ; Catalog Code:   calcium-vitamin D ; Order Dt/Tm:   9/30/2019 11:10:43 AM CDT          cyanocobalamin  :   cyanocobalamin ; Status:   Documented ; Ordered As Mnemonic:   Vitamin B-12 ; Simple Display Line:   0 Refill(s) ; Catalog Code:   cyanocobalamin ; Order Dt/Tm:   9/30/2019 11:11:28 AM CDT          cycloSPORINE ophthalmic  :   cycloSPORINE ophthalmic ; Status:   Documented ; Ordered As Mnemonic:   cycloSPORINE 0.1% ophthalmic emulsion ; Simple Display Line:   0 Refill(s) ; Catalog Code:   cycloSPORINE ophthalmic ; Order Dt/Tm:   9/30/2019 11:09:49 AM CDT          echinacea  :   echinacea ; Status:   Documented ; Ordered As Mnemonic:   echinacea ; Simple Display Line:   0 Refill(s) ; Catalog Code:   echinacea ; Order Dt/Tm:   9/30/2019 11:10:11 AM CDT          ferrous sulfate  :   ferrous sulfate ; Status:   Documented ; Ordered As Mnemonic:   ferrous sulfate ; Simple Display Line:   Oral, 0 Refill(s) ; Catalog Code:   ferrous sulfate ; Order Dt/Tm:   10/28/2020 2:01:47 PM CDT          Miscellaneous Prescription  :   Miscellaneous Prescription ; Status:   Documented ; Ordered As Mnemonic:   cayenne ; Simple Display Line:   0 Refill(s) ; Catalog Code:   Miscellaneous Prescription ; Order Dt/Tm:   9/30/2019 11:11:19 AM CDT          multivitamin  :   multivitamin ; Status:   Documented ; Ordered As Mnemonic:   Multi Vitamin+ ; Simple Display Line:   0 Refill(s) ; Catalog Code:   multivitamin ; Order Dt/Tm:   9/30/2019 11:10:02 AM CDT          omega-3 polyunsaturated fatty acids  :   omega-3 polyunsaturated fatty acids ; Status:   Documented ; Ordered As Mnemonic:   Fish Oil ; Simple Display Line:   Oral, 0 Refill(s) ; Catalog Code:   omega-3 polyunsaturated fatty acids ; Order Dt/Tm:   9/30/2019 11:11:05 AM CDT          prednisoLONE ophthalmic  :   prednisoLONE ophthalmic ; Status:   Documented ; Ordered As Mnemonic:   prednisoLONE acetate 1% ophthalmic suspension ; Simple Display  Line:   0 Refill(s) ; Catalog Code:   prednisoLONE ophthalmic ; Order Dt/Tm:   2/4/2019 11:28:33 AM CST ; Comment:   Responsible Provider: ZACH KEENAN            Social History   Social History   (As Of: 10/28/2020 2:05:43 PM CDT)   Alcohol:  Current      1-2 times per week, 1 drinks/episode average.  Ready to change: No.   Comments:  9/3/2010 3:24 PM - Melanie Mcintyre LPN: occ, very little   (Last Updated: 4/9/2020 11:48:02 AM CDT by Mya Lopez)          Tobacco:  Denies Tobacco Use      Never   (Last Updated: 9/6/2012 8:06:39 AM CDT by Mya Lopez)          Substance Abuse:  Denies Substance Abuse      Never   (Last Updated: 9/6/2012 8:06:45 AM CDT by Mya Lopez)          Employment/School:        Retired   (Last Updated: 9/13/2018 2:20:23 PM CDT by Mya Lopez)          Home/Environment:        Marital status: .  Spouse/Partner name: Shaila.  Living situation: Home/Independent.  Injuries/Abuse/Neglect in household: No.  Feels unsafe at home: No.  Family/Friends available for support: Yes.   (Last Updated: 4/9/2020 11:48:44 AM CDT by Mya Lopez)          Nutrition/Health:        Type of diet: Regular.  Wants to lose weight: No.  Sleeping concerns: No.  Feels highly stressed: No.   (Last Updated: 4/9/2020 11:48:16 AM CDT by Mya Lopez)          Exercise:  Regular exercise      Exercise frequency: 5-6 times/week.  Exercise type: Walking.   (Last Updated: 12/5/2018 1:38:42 PM CST by Mya Lopez)          Sexual:        Sexually active: Yes.  Identifies as male, Sexual orientation: Straight or heterosexual.  History of STD: No.  Contraceptive Use Details: None.  History of sexual abuse: No.   (Last Updated: 4/9/2020 11:49:12 AM CDT by Mya Lopez)            Geriatric Depression Screening   Geriatric Depression Satisfied Life :   Yes   Geriatric Depression Dropped Activities :   Yes   Geriatric Depression Life Empty :   No   Geriatric Depression Bored :   Yes   Geriatric Depression  Good Spirits :   Yes   Geriatric Depression Afraid Bad Things :   Yes   Geriatric Depression Feel Happy :   Yes   Geriatric Depression Feel Helpless :   Yes   Geriatric Depression Prefer to Stay Home :   No   Geriatric Depression Memory Problems :   No   Geriatric Depression Wonderful Be Alive :   Yes   Geriatric Depression Feel Worthless :   No   Geriatric Depression Situation Hopeless :   No   Geriatric Depression Others Better Off :   No   Geriatric Depression Full of Energy :   No   Geriatric Depression Total Score :   5    Sunshine Harp CMA - 10/28/2020 1:58 PM CDT   Hearing and Vision Screening   Audiogram Result Right Ear :   Fail   Audiogram Result Left Ear :   Fail   Hearing Screen Comments :   4000hz   Vision Screen Comments :   [facility]   Sunshine Harp CMA - 10/28/2020 1:58 PM CDT   Home Safety Screen   Emergency Numbers Kept/Updated :   Yes   Aware of Smoking Dangers :   Yes   Smoke Alarms/Fire Extinguisher Available :   Yes   Household Members Fire Safety Knowledge :   Yes   Firearms Unloaded and Secure :   Yes   Floor Rugs Removed or Fastened :   No   Mats in Bathtub/Shower :   No   Stairway Rails or Banisters :   Yes   Outdoor Clutter Safety :   Yes   Indoor Clutter Safety :   No   Electrical Cord Safety :   Yes   Sunshine Harp CMA - 10/28/2020 1:58 PM CDT   Advance Directives   Advance Directive :   No   Sunshine Harp CMA - 10/28/2020 1:58 PM CDT   Functional Assessment   Focused Functional Assessment Grid   Bathing :   Independent (2)   Dressing :   Independent (2)   Toileting :   Independent (2)   Transferring Bed or Chair :   Independent (2)   Feeding :   Independent (2)   Sunshine Harp CMA - 10/28/2020 1:58 PM CDT   Capable of Shopping :   Yes   Capable of Walking :   Yes   Capable of Housekeeping :   Yes   Capable of Managing Medications :   Yes   Capable of Handling Finances :   Yes   Sunshine Harp CMA - 10/28/2020 1:58 PM CDT

## 2022-02-15 NOTE — TELEPHONE ENCOUNTER
---------------------  From: Jessica Bui CMA (Phone Messages Pool ([id number]_[facility]))   To: Bam Arnold PA-C;     Sent: 7/28/2020 11:54:33 AM CDT  Subject: FW: Med refills needed     Pt was last seen on 7/6/2020 for back pain.   Percocet was last refilled on 7/13/20 #12  Cyclobenzaprine last refilled on 7/6/20 #30 w/ 0 refills.       ---------------------  From: BIBI SHAW  To: [facility]  Sent: 07/28/2020 11:40 a.m. CDT  Subject: Med refills needed  I need refills for cyclobenzaprine and oxycodone for my back spasms.   My next appt at [facility] is next Monday so please enough to get through until then.  Bam Arnold is my primary dr.  Please sent to [facility] in [place].---------------------  From: Bam Arnold PA-C   To: Phone Car in the [employer] Pool ([id number]_[facility]);     Sent: 7/28/2020 1:11:20 PM CDT  Subject: RE: Med refills needed     I checked PDMP and sent those in    KAH---------------------  From: Jessica Bui CMA ([employer] in [employer] Pool ([id number]_[facility]))   To: BIBI SHAW    Sent: 7/28/2020 3:14:17 PM CDT  Subject: FW: Med refills needed     Shankar Deutsch,    These prescriptions have been sent to the pharmacy.     Hao GALE CMA

## 2022-02-15 NOTE — LETTER
(Inserted Image. Unable to display)   [address] [place]  [address]  [phone]    October 01, 2019      BIBI SHAW      [address], [place] [id number]        Dear BIBI,    Thank you for selecting [facility] for your healthcare needs. Below you will find the result of your recent test(s) done at our clinic.     This is normal.      Result Name Current Result Previous Result Reference Range   PSA (ng/mL)  3.2 9/30/2019  3.3 9/5/2019  - < OR = 4.0       Please contact my practice at [phone] if you have any questions or concerns.      Sincerely,        Andres Medrano MD ,   Nela Mcarthur MD,   Bam Arnold PA-C

## 2022-02-15 NOTE — RESULTS
(Inserted Image. Unable to display)   (Inserted Image. Unable to display)     [address] [address]  Phone [phone]  Fax [phone]      ELECTROCARDIOGRAM REPORT    BIBI SHAW   :  1950   DATE OF EXAM: 2019   MRN: [id number]  Ordering HCP: Bam Arnold PA-C      Indication:  Lightheadedness.    Findings:  Sinus rhythm.  No acute abnormality.    Interpretation:  Normal electrocardiogram.      Dolores Mcarthur MD  Interpreting HCP    AMELIA/coirne  D: 2019  T: 2019    ELECTROCARDIOGRAM REPORT

## 2022-02-15 NOTE — TELEPHONE ENCOUNTER
---------------------  From: Sunshine Harp CMA (Phone Messages Pool ([id number]_[facility]))   To: BIBI SHAW    Sent: 7/14/2020 4:27:47 PM CDT  Subject: FW: Recent back xray           Entered by Sunshine Harp CMA on July 14, 2020 4:27:31 PM CDT  I will fax Xray report and MRI.       ---------------------  From: BIBI SHAW  To: [facility]  Sent: 07/14/2020 04:18 p.m. CDT  Subject: Recent back xray  Would you be able to sent the xrays of my back on July 2nd at [facility] to [facility] in [place]?  I have an appt with them this Thursday morning and without those xrays they will likely xray my back again.

## 2022-02-15 NOTE — TELEPHONE ENCOUNTER
---------------------  From: Jessica Bui CMA (Phone Messages Pool ([id number]_[facility])   To: Bam Arnold PA-C;     Sent: 5/27/2020 11:26:05 AM CDT  Subject: Phone Note: Covid-19     Phone Message    PCP:   [facility]      Time of Call:  9:34 am    Phone number:  [phone]    Returned call at: 11:25 am    Note:   Pt called stating that he would like to have the Covid-19 antibodies drawn to see if he has had it. States that he has had symptoms in the past and just wants to know. Due to his cancer he has not seen his grandchildren in the last three months as he is high risk. He has a lab apt on 6/16/20 for cancer labs and would like to have it done then. Please advise and what Dx to use.     Pharmacy: n/a    Last office visit and reason: 9/30/19; AWV     Transferred to: KAH---------------------  From: Bam Arnold PA-C   To: Phone Messages Pool ([id number]_[place]  [facility]);     Sent: 5/27/2020 11:28:19 AM CDT  Subject: RE: Phone Note: Covid-19     OK to add to his orders for June 16 Covid Ab test.  Dx: ?persistent cough    KAHReturned Call  Time: 11:41 am  Note:  Called and notified patient of the order and added a RTC.

## 2022-02-15 NOTE — NURSING NOTE
CAGE Assessment Entered On:  11/1/2021 10:50 AM CDT    Performed On:  11/1/2021 10:50 AM CDT by Radha Nj MA               Assessment   Have you ever felt you should cut down on your drinking :   No   Have people annoyed you by criticizing your drinking :   No   Have you ever felt bad or guilty about your drinking :   No   Have you ever taken a drink first thing in the morning to steady your nerves or get rid of a hangover (Eye-opener) :   No   CAGE Score :   0    Radha Nj MA - 11/1/2021 10:50 AM CDT

## 2022-02-15 NOTE — LETTER
(Inserted Image. Unable to display)                                                                                                [address] [address]                                                      March 01, 2021  Re: BIBI BARRAZANES  1950      Blake Langston MD  [facility] - Vascular Surgery  [address] [place] [address]      To: Dr. Langston    The following patient has been referred to your office/practice:  BIBI SHAW         Appointment:  Appointment is Pending          Please refer to the attached  clinical documentation for a summary of BIBI's care.  Please do not hesitate to contact our office if any additional clinical questions arise.  All relevant records and transition of care documents should be mailed or faxed.    Your assistance in providing continuity of care is appreciated.         Sincerely,   [facility]   [facility]  [address] [place] [address]  (P) [phone]  (F) [phone]

## 2022-02-15 NOTE — NURSING NOTE
Comprehensive Intake Entered On:  2/4/2019 10:47 AM CST    Performed On:  2/4/2019 10:44 AM CST by Sunshine Harp CMA               Summary   Chief Complaint :   not feeling well, sweat, feels lightheaded   Systolic Blood Pressure :   130 mmHg   Diastolic Blood Pressure :   82 mmHg (HI)    Mean Arterial Pressure :   98 mmHg   Peripheral Pulse Rate :   73 bpm   BP Site :   Left arm   BP Method :   Manual   HR Method :   Electronic   Temperature Tympanic :   97.6 DegF(Converted to: 36.4 DegC)  (LOW)    Oxygen Saturation :   99 %   Sunshine Harp CMA - 2/4/2019 10:44 AM CST   Health Status   Allergies Verified? :   Yes   Medication History Verified? :   Yes   Medical History Verified? :   Yes   Tobacco Use? :   Never smoker   Sunshine Harp CMA - 2/4/2019 10:44 AM CST   Consents   Consent for Immunization Exchange :   Consent Granted   Consent for Immunizations to Providers :   Consent Granted   Sunshine Harp CMA - 2/4/2019 10:44 AM CST   Meds / Allergies   (As Of: 2/4/2019 10:47:27 AM CST)   Allergies (Active)   No Known Medication Allergies  Estimated Onset Date:   Unspecified ; Created By:   Jessica Bui CMA; Reaction Status:   Active ; Category:   Drug ; Substance:   No Known Medication Allergies ; Type:   Allergy ; Updated By:   Jessica Bui CMA; Reviewed Date:   2/4/2019 10:46 AM CST        Medication List   (As Of: 2/4/2019 10:47:27 AM CST)   Prescription/Discharge Order    atorvastatin  :   atorvastatin ; Status:   Prescribed ; Ordered As Mnemonic:   Lipitor 40 mg oral tablet ; Simple Display Line:   40 mg, 1 tab(s), PO, Daily, 90 tab(s), 3 Refill(s) ; Ordering Provider:   Bam Arnold PA-C; Catalog Code:   atorvastatin ; Order Dt/Tm:   9/10/2018 8:04:51 AM          finasteride  :   finasteride ; Status:   Prescribed ; Ordered As Mnemonic:   finasteride 5 mg oral tablet ; Simple Display Line:   5 mg, 1 tab(s), PO, Daily, 90 tab(s), 3 Refill(s) ; Ordering Provider:   Bam Arnold PA-C; Catalog Code:    finasteride ; Order Dt/Tm:   9/10/2018 8:04:49 AM

## 2022-02-15 NOTE — TELEPHONE ENCOUNTER
---------------------  From: Sunshine Harp CMA (eRx Pool ([id number]_[facility]))   To: Bam Arnold PA-C;     Sent: 1/5/2021 8:47:44 AM CST  Subject: FW: Medication Management   Due Date/Time: 1/5/2021 2:45:00 PM CST           Entered by Sunshine Harp CMA on January 05, 2021 8:47:39 AM CST    PCP:  [facility]    Medication:  triamcinolone cream  Last Filled:  10/29/2020    Quantity: 30gm  Refills:  0    Date of last office visit and reason:  AWV 10/28/2020  Date of last labs pertaining to condition:  n/s    Note:  Please advise.      Return to Clinic order placed?  yes        ------------------------------------------  From: [place] [id number] [place] [facility]  To: Bam Arnold PA-C  Sent: January 4, 2021 2:45:11 PM CST  Subject: Medication Management  Due: December 24, 2020 1:58:33 PM CST     ** On Hold Pending Signature **     Dispensed Drug: triamcinolone topical (triamcinolone 0.1% topical cream), APPLY TO AFFECTED AREA TWICE A DAY  Quantity: 30 gm  Days Supply: 15  Refills: 0  Substitutions Allowed  Notes from Pharmacy:  ---------------------------------------------------------------  From: Bam Arnold PA-C   To: [facility] [id number] [facility]    Sent: 1/5/2021 8:54:01 AM CST  Subject: FW: Medication Management     ** Submitted: **  Complete:triamcinolone topical (triamcinolone 0.1% topical cream)   Signed by Bam Arnold PA-C  1/5/2021 2:54:00 PM [facility]    ** Approved **  triamcinolone topical (TRIAMCINOLONE 0.1% CREAM)  APPLY TO AFFECTED AREA TWICE A DAY  Qty:  30 gm        Days Supply:  15        Refills:  0          Substitutions Allowed     Route To Pharmacy - [id number]

## 2022-02-15 NOTE — PROGRESS NOTES
Chief Complaint    c/o left lower back spasm. Not able to sit down and stand up very well  History of Present Illness      70-year-old here with low back pain.       Patient says for 5 years ago he had back pain that led to surgery.  Since then he has had almost no back issues.  He walks and says he was walking up to 10 miles in a day.  Until about a month ago when he started to have some low back pain.  He thought it was just over exercise so he just took it easy.  Pain is been slowly progressive for now for the last several days of been quite severe.  He had trouble getting out of bed this morning.  He just feels spasm and tight.  He has not had pain going down the legs.  No fever or chills.  He has had no trouble with bowel or bladder function  Review of Systems      See HPI.  All other review of systems negative.  Physical Exam   Vitals & Measurements    HR: 84(Peripheral)  BP: 122/64       Patient is obviously uncomfortable preferring to stand.  The area of pain is over the left SI but there is no skin changes no obvious spasm is got good range of motion of the hip and leg.  He actually walks relatively well until he says a spasm occurred and then it is hard for him to keep his balance and stand  Assessment/Plan       Back pain (M54.9)         X-ray of his back show significant degenerative changes I do not see any acute changes but radiology will be over reading.  I am concerned that he will need further imaging but he is willing to try a course of prednisone along with some pain medicine for a few days.  I note he was given Toradol here in the clinic which seemed overall helped him significantlyP         Ordered:          acetaminophen-oxycodone, 1 tab(s), Oral, q6 hrs, # 12 tab(s), 0 Refill(s), Type: Acute, Pharm[id number], 1 tab(s) Oral q6 hrs, 73, in, 09/30/19 10:57:00 CDT, Height Measured, 224, lb, 06/16/20 11:33:00 CDT, Weight Measured, (Ordered)          ketorolac, 60 mg, im, once,  (Completed)          86266 therapeutic prophylactic/dx injection subq/im (Charge), Quantity: 1, Back pain           ketorolac tromethamine inj, 15 mg (Charge), Quantity: 4, Back pain          XR Lumbar Spine 3 Views (Request), Back pain                Orders:         predniSONE, = 2 tab(s) ( 40 mg ), Oral, daily, x 5 day(s), # 10 tab(s), 0 Refill(s), Type: Acute, Pharmacy: [facility] #[id number], 2 tab(s) Oral daily,x5 day(s), 73, in, 09/30/19 10:57:00 CDT, Height Measured, 224, lb, 06/16/20 11:33:00 CDT, Weight Measured, (Ordered)  Patient Information     Name:BIBI SHAW MELY      Address:      [address] [place] [id number]     Sex:Male     YOB: 1950     [phone]     Emergency Contact:MADISON SHAW     MRN:[id number]     FIN:[id number]     Location:[facility]     Date of Service:07/02/2020      Primary Care Physician:       Bam Arnold PA-C, [phone]      Attending Physician:       Dallin HERBERT, , [phone]  Problem List/Past Medical History    Ongoing     BPH (Benign Prostatic Hypertrophy)     CLL (chronic lymphocytic leukemia)     Common Peroneal Nerve Dysfunction     Ed (Erectile Dysfunction)     Fuchs' corneal dystrophy     Hyperlipidemia NOS     Obese    Historical     Amputation of finger of left hand       Comments: Index fiinger.     Lumbar disc herniation       Comments: Left L5-S1 with S1 radiculopathy.  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     Microdiscectomy (11/12/2003)      Comments: L5-S1.     Colonoscopy (2000)  Medications    calcium (as carbonate)-vitamin D 600 mg-125 intl units oral tablet, 1 tab(s), Oral, tid    cayenne    cycloSPORINE 0.1% ophthalmic emulsion    echinacea    finasteride 5 mg oral tablet, 5 mg= 1 tab(s), Oral, daily, 3 refills    Fish Oil, Oral    Lipitor 40 mg oral tablet, 40 mg= 1 tab(s), Oral, daily, 3 refills    Multi Vitamin+    Percocet 5 mg-325 mg  oral tablet, 1 tab(s), Oral, q6 hrs    prednisoLONE acetate 1% ophthalmic suspension    predniSONE 20 mg oral tablet, 40 mg= 2 tab(s), Oral, daily    Vitamin B-12    Vitamin C, daily  Allergies    No Known Medication Allergies  Social History    Smoking Status - 09/30/2019     Never smoker     Alcohol - Current, 04/09/2020      1-2 times per week, 1 drinks/episode average. Ready to change: No., 04/09/2020     Employment/School      Retired, 09/13/2018     Exercise - Regular exercise, 12/05/2018      Exercise frequency: 5-6 times/week. Exercise type: Walking., 12/05/2018     Home/Environment      Marital status: . Spouse/Partner name: Shaila. Living situation: Home/Independent. Injuries/Abuse/Neglect in household: No. Feels unsafe at home: No. Family/Friends available for support: Yes., 04/09/2020     Nutrition/Health      Type of diet: Regular. Wants to lose weight: No. Sleeping concerns: No. Feels highly stressed: No., 04/09/2020     Sexual      Sexually active: Yes. Identifies as male, Sexual orientation: Straight or heterosexual. History of STD: No. Contraceptive Use Details: None. History of sexual abuse: No., 04/09/2020     Substance Abuse - Denies Substance Abuse, 09/06/2012      Never, 09/06/2012     Tobacco - Denies Tobacco Use, 09/03/2010      Never, 09/06/2012  Family History    Allergic rhinitis: Father.    Alzheimer's disease: Mother.    Arthritis: Mother and Brother.    CA - Cancer: Brother.    Cardiovascular Disease: Mother.    Diabetes mellitus: Son.    Diabetes mellitus type I: Daughter and Son.    Heart disease: Grandmother (M).    Hypercholesterolemia: Mother, Father, Brother and Brother.    Hypertension: Father.    Stroke: Grandmother (M).  Immunizations      Vaccine Date Status          influenza virus vaccine, inactivated 09/30/2019 Given          influenza virus vaccine, inactivated 10/10/2018 Given          pneumococcal (PPSV23) 09/05/2018 Given          influenza virus vaccine,  inactivated 09/13/2017 Given          pneumococcal (PCV13) 09/13/2017 Given          influenza virus vaccine, inactivated 11/28/2016 Given          influenza virus vaccine, inactivated 09/16/2015 Given          influenza virus vaccine, inactivated 12/12/2014 Given          influenza virus vaccine, inactivated 11/06/2013 Given          ZOS, shingles 09/13/2013 Given          influenza virus vaccine, inactivated 10/29/2012 Given          influenza virus vaccine, inactivated 12/06/2011 Given          influenza 10/26/2010 Given          Td 06/06/2005 Recorded  Lab Results          Lab Results (Last 4 results within 90 days)           Sodium Level: 141 mmol/L [135 mmol/L - 146 mmol/L] (06/16/20 11:33:00)          Potassium Level: 4.2 mmol/L [3.5 mmol/L - 5.3 mmol/L] (06/16/20 11:33:00)          Chloride Level: 107 mmol/L [98 mmol/L - 110 mmol/L] (06/16/20 11:33:00)          CO2 Level: 26 mmol/L [20 mmol/L - 32 mmol/L] (06/16/20 11:33:00)          Glucose Level: 98 mg/dL [65 mg/dL - 99 mg/dL] (06/16/20 11:33:00)          BUN: 25 mg/dL [7 mg/dL - 25 mg/dL] (06/16/20 11:33:00)          Creatinine Level: 0.97 mg/dL [0.7 mg/dL - 1.25 mg/dL] (06/16/20 11:33:00)          BUN/Creat Ratio: NOT APPLICABLE [6  - 22] (06/16/20 11:33:00)          eGFR: 80 mL/min/1.73m2 (06/16/20 11:33:00)          eGFR : 93 mL/min/1.73m2 (06/16/20 11:33:00)          Calcium Level: 9.8 mg/dL [8.6 mg/dL - 10.3 mg/dL] (06/16/20 11:33:00)          Bilirubin Total: 0.7 mg/dL [0.2 mg/dL - 1.2 mg/dL] (06/16/20 11:33:00)          Alkaline Phosphatase: 120 unit/L [35 unit/L - 144 unit/L] (06/16/20 11:33:00)          AST/SGOT: 23 unit/L [10 unit/L - 35 unit/L] (06/16/20 11:33:00)          ALT/SGPT: 28 unit/L [9 unit/L - 46 unit/L] (06/16/20 11:33:00)          LDH: 132 unit/L [120 unit/L - 250 unit/L] (06/16/20 11:33:00)          Protein Total: 6.5 gm/dL [6.1 gm/dL - 8.1 gm/dL] (06/16/20 11:33:00)          Albumin Level: 4.6 gm/dL [3.6 gm/dL -  5.1 gm/dL] (06/16/20 11:33:00)          Globulin: 1.9 [1.9  - 3.7] (06/16/20 11:33:00)          A/G Ratio: 2.4 [1  - 2.5] (06/16/20 11:33:00)          WBC: 95.3 High [3.8  - 10.8] (06/16/20 11:33:00)          RBC: 3.91 Low [4.2  - 5.8] (06/16/20 11:33:00)          Hgb: 12.7 gm/dL Low [13.2 gm/dL - 17.1 gm/dL] (06/16/20 11:33:00)          Hct: 37.9 % Low [38.5 % - 50 %] (06/16/20 11:33:00)          MCV: 96.9 fL [80 fL - 100 fL] (06/16/20 11:33:00)          MCH: 32.5 pg [27 pg - 33 pg] (06/16/20 11:33:00)          MCHC: 33.5 gm/dL [32 gm/dL - 36 gm/dL] (06/16/20 11:33:00)          RDW: 12.7 % [11 % - 15 %] (06/16/20 11:33:00)          Platelet: 157 [140  - 400] (06/16/20 11:33:00)          MPV: 11.2 fL [7.5 fL - 12.5 fL] (06/16/20 11:33:00)          Lymphocytes: 89 % (06/16/20 11:33:00)          Abs Lymphocytes: 49645 High [850  - 3900] (06/16/20 11:33:00)          Neutrophils: 7 % (06/16/20 11:33:00)          Abs Neutrophils: 6671 [1500  - 7800] (06/16/20 11:33:00)          Monocytes: 4 % (06/16/20 11:33:00)          Abs Monocytes: 3812 High [200  - 950] (06/16/20 11:33:00)          Eosinophils: 0 % (06/16/20 11:33:00)          Abs Eosinophils: 0 Low [15  - 500] (06/16/20 11:33:00)          Basophils: 0 % (06/16/20 11:33:00)          Abs Basophils: 0 [0  - 200] (06/16/20 11:33:00)          Differential Comment: Few atypical lymphocytes noted Smudge cells present. (06/16/20 11:33:00)          Differential Comment: See comment (06/16/20 11:33:00)          Coronavirus SARS-CoV-2 (COVID-19) Ab IgG: NEGATIVE (06/16/20 11:33:00)

## 2022-02-15 NOTE — NURSING NOTE
Comprehensive Intake Entered On:  1/17/2019 9:35 AM CST    Performed On:  1/17/2019 9:31 AM CST by Jessica Bui CMA               Summary   Chief Complaint :   Preop: Corneal Transplant of the left eye with Dr. Gomez at [facility] in [place] on 1/29/19   Weight Measured :   217 lb(Converted to: 217 lb 0 oz, 98.43 kg)    Height Measured :   73 in(Converted to: 6 ft 1 in, 185.42 cm)    Body Mass Index :   28.63 kg/m2 (HI)    Body Surface Area :   2.25 m2   Systolic Blood Pressure :   122 mmHg   Diastolic Blood Pressure :   80 mmHg   Mean Arterial Pressure :   94 mmHg   Peripheral Pulse Rate :   80 bpm   BP Site :   Right arm   Pulse Site :   Radial artery   BP Method :   Manual   HR Method :   Manual   Temperature Tympanic :   95.9 DegF(Converted to: 35.5 DegC)  (LOW)    Jessica Bui CMA - 1/17/2019 9:31 AM CST   Health Status   Allergies Verified? :   Yes   Medication History Verified? :   Yes   Medical History Verified? :   Yes   Pre-Visit Planning Status :   Completed   Tobacco Use? :   Never smoker   Jessica Bui CMA - 1/17/2019 9:31 AM CST   Consents   Consent for Immunization Exchange :   Consent Granted   Consent for Immunizations to Providers :   Consent Granted   Jessica Bui CMA - 1/17/2019 9:31 AM CST   Meds / Allergies   (As Of: 1/17/2019 9:35:44 AM CST)   Allergies (Active)   No Known Medication Allergies  Estimated Onset Date:   Unspecified ; Created By:   Jessica Bui CMA; Reaction Status:   Active ; Category:   Drug ; Substance:   No Known Medication Allergies ; Type:   Allergy ; Updated By:   Jessica Bui CMA; Reviewed Date:   1/17/2019 9:32 AM CST        Medication List   (As Of: 1/17/2019 9:35:44 AM CST)   Prescription/Discharge Order    atorvastatin  :   atorvastatin ; Status:   Prescribed ; Ordered As Mnemonic:   Lipitor 40 mg oral tablet ; Simple Display Line:   40 mg, 1 tab(s), PO, Daily, 90 tab(s), 3 Refill(s) ; Ordering Provider:   Bam Arnold PA-C; Catalog Code:    atorvastatin ; Order Dt/Tm:   9/10/2018 8:04:51 AM          finasteride  :   finasteride ; Status:   Prescribed ; Ordered As Mnemonic:   finasteride 5 mg oral tablet ; Simple Display Line:   5 mg, 1 tab(s), PO, Daily, 90 tab(s), 3 Refill(s) ; Ordering Provider:   Bam Arnold PA-C; Catalog Code:   finasteride ; Order Dt/Tm:   9/10/2018 8:04:49 AM            Procedures / Surgeries        -    Procedure History   (As Of: 1/17/2019 9:35:44 AM CST)     Procedure Dt/Tm:   2000 ; Anesthesia Minutes:   0 ; Procedure Name:   Colonoscopy ; Procedure Minutes:   0            Procedure Dt/Tm:   11/12/2003 ; Anesthesia Minutes:   0 ; Procedure Name:   Microdiscectomy ; Procedure Minutes:   0 ; Comments:     11/30/2010 4:07 PM CST - Nela Cage  L5-S1            Procedure Dt/Tm:   11/2018 ; Anesthesia Minutes:   0 ; Procedure Name:   Corneal transplant ; Procedure Minutes:   0 ; Comments:     1/17/2019 9:35 AM CST - Jessica Bui CMA  Right Eye ; Last Reviewed Dt/Tm:   1/17/2019 9:35:14 AM CST

## 2022-02-15 NOTE — TELEPHONE ENCOUNTER
---------------------  From: Sunshine Harp CMA (Phone Messages Pool ([id number]_[facility]))   Sent: 7/14/2020 2:21:05 PM CDT  Subject: phone note     Phone Message    PCP:    K[facility]      Time of Call:  2:10 pm       Person Calling:  Get  Phone number:  [phone]    Returned call at: 2:18 pm    Note:  Patient left a message stating he can not get into see [facility] until next Monday. Wondering if he can get in somewhere else sooner. I called patient and told him to go to [facility] in [place], [place]. They have a walk in clinic from 8am-8pm. Phone # also given [phone].

## 2022-02-15 NOTE — LETTER
(Inserted Image. Unable to display) October 30, 2020Re: BIBI SHAWDOB:  1950 [facility] [address] [place], [place] [address]Ld:  [facility]The following patient has been referred to your office/practice:   BIBI SHAW Appointment : 11/05/2020Location : [facility] refer to the attached clinical documentation for a summary of BIBI's care.  Please do not hesitate to contact our office if any additional clinical questions arise. All relevant records and transition of care documents should be mailed or faxed.Your assistance in providing continuity of care is appreciatedSincerely, [facility] & [address] [address](P) [phone](F) [phone]

## 2022-02-15 NOTE — PROGRESS NOTES
Patient:   BIBI SHAW            MRN: [id number]            FIN: [id number]               Age:   70 years     Sex:  Male     :  1950   Associated Diagnoses:   Hyperlipidemia NOS; Left peroneal nerve palsy; Annual physical exam; CLL (chronic lymphocytic leukemia); BPH (Benign Prostatic Hypertrophy); Lumbar disc herniation; Dry skin dermatitis   Author:   Bam Arnold PA-C      Report Summary   Diagnosis  Hyperlipidemia NOS (TRM10-CX E78.5).  Course:  Progressing as expected. OrdersPlanPatient Instructions:       Counseled: Preventive service checklist reviewed, updated and copy given to patient.  Please see scanned document.. Counseled:  Patient, and appropriate weight and diet.    Visit Information      Date of Service: 10/28/2020 01:44 pm  [facility]  Encounter#: [id number]      Primary Care Provider (PCP):  Bam Arnold PA-C    NPI# [id number]      Referring Provider:  Bam Arnold PA-C    NPI# [id number]   Visit type:  General concerns, Annual Wellness Visit.    Source of history:  Self.    History limitation:  None.       Chief Complaint   10/28/2020 1:58 PM CDT   AWV     Annual Wellness Medicare Physical      History of Present Illness             The patient presents for a general exam.  Complaint:.  Review of functional ability.  1. Hearing impairment (Hearing aids, symptoms, history)      2. Activities of daily living (Hygiene, eating, cooking, cleaning, shopping, errands )  3. Falls risk (Walking, transferring, cane, walker, aids)   4. Home safety (Surfaces, steps)   .        Interval History   Hearing is addressed and positives noted, Activities of Daily Living  going O.K. (no problems identified), Risk for falls addressed and safe, Home safety assessed (steps/surfaces) and no problem identified. Back is manageable. PSA mildly elevated. Needs full skin check. Will be having PET scan in the near future.       Review of Systems   Constitutional:  Fatigue.     Eye:  Negative.    Ear/Nose/Mouth/Throat:  Negative.    Respiratory:  Negative.    Cardiovascular:  Negative.    Gastrointestinal:  Negative.    Genitourinary:  Negative.    Hematology/Lymphatics:  Negative except as documented in history of present illness.    Endocrine:  Negative.    Immunologic:  Negative except as documented in history of present illness.    Musculoskeletal:       Back pain: Bilaterally, In the lower region.    Integumentary:  Negative except as documented in history of present illness.    Neurologic:  Negative except as documented in history of present illness.    Psychiatric:  Negative.       Health Status   Allergies:    Allergic Reactions (Selected)  No Known Medication Allergies   Medications:  (Selected)   Prescriptions  Prescribed  Lipitor 40 mg oral tablet: = 1 tab(s) ( 40 mg ), PO, Daily, # 90 tab(s), 3 Refill(s), Type: Maintenance, Pharmacy: [facility], 1 tab(s) Oral daily  cyclobenzaprine 10 mg oral tablet: = 1 tab(s) ( 10 mg ), Oral, tid, PRN: for spasm, # 30 tab(s), 0 Refill(s), Type: Maintenance, Pharmacy: [id number], 1 tab(s) Oral tid,PRN:for spasm, 73, in, 09/30/19 10:57:00 CDT, Height Measured, 224, lb, 06/16/20 11:33:00 CDT, Weigh...  finasteride 5 mg oral tablet: = 1 tab(s) ( 5 mg ), PO, Daily, # 90 tab(s), 3 Refill(s), Type: Maintenance, Pharmacy: [facility], 1 tab(s) Oral daily  Documented Medications  Documented  Fish Oil: Oral, 0 Refill(s), Type: Maintenance  Multi Vitamin+: 0 Refill(s), Type: Maintenance  Vitamin B-12: 0 Refill(s), Type: Maintenance  Vitamin C: daily, 0 Refill(s), Type: Maintenance  acyclovir 800 mg oral tablet: = 1 tab(s) ( 800 mg ), Oral, daily, 0 Refill(s), Type: Maintenance  calcium (as carbonate)-vitamin D 600 mg-125 intl units oral tablet: 1 tab(s), Oral, tid, # 270 tab(s), 0 Refill(s), Type: Maintenance  cayenne: cayenne, 0 Refill(s), Type: Maintenance  cycloSPORINE 0.1% ophthalmic emulsion: 0 Refill(s), Type:  Maintenance  echinacea: 0 Refill(s), Type: Maintenance  ferrous sulfate: Oral, 0 Refill(s), Type: Maintenance  prednisoLONE acetate 1% ophthalmic suspension: 0 Refill(s), Type: Soft Stop   Problem list:    All Problems  Obese / ICD-9-.00 / Probable  Hyperlipidemia NOS / ICD-9-.4 / Confirmed  Hyperlipidemia NOS / SNOMED CT 9576396816 / Confirmed  Fuchs' corneal dystrophy / SNOMED CT 222530846 / Confirmed  Ed (Erectile Dysfunction) / ICD-9-.84 / Confirmed  Common Peroneal Nerve Dysfunction / SNOMED CT 796282474 / Confirmed  CLL (chronic lymphocytic leukemia) / SNOMED CT 924205098 / Confirmed  BPH (Benign Prostatic Hypertrophy) / ICD-9-.00 / Confirmed  Annual physical exam / SNOMED CT 606259326 / Confirmed  Resolved: Lumbar disc herniation / SNOMED CT 993697681  Resolved: Amputation of finger of left hand / SNOMED CT 304393090   CURRENT PROVIDERS AND SUPPLIERS  NAME  SPECIALTY  REASON      Histories   Past Medical History:    Active  Hyperlipidemia NOS (272.4)  Ed (Erectile Dysfunction) (607.84)  BPH (Benign Prostatic Hypertrophy) (600.00)  Common Peroneal Nerve Dysfunction (755597696)  Obese (278.00)  CLL (chronic lymphocytic leukemia) (097913850)  Fuchs' corneal dystrophy (071672264)  Resolved  Lumbar disc herniation (395105155): Onset in  at 53 years.  Resolved.  Comments:  2015 CDT 12:11 PM Ama Saucedo  Left L5-S1 with S1 radiculopathy.  Amputation of finger of left hand (775833714): Onset in  at 27 years.  Resolved.  Comments:  2015 CDT 12:15 PM Ama Saucedo  Index fiinger.   Family History:    Daughter: Carly    Diabetes mellitus type I  Son: Reji    Diabetes mellitus  Son: Arnie    Diabetes mellitus type I  Brother  Arthritis  Grandmother (M): General Family Hx    Heart disease  Stroke  Brother  Hypercholesterolemia  Mother  Hypercholesterolemia  Cardiovascular Disease  Arthritis  Alzheimer's disease  Father:  ()  Cause of Death: Motor vehicle  accident  Age at death unknown.   Allergic rhinitis  Hypercholesterolemia  Hypertension  Brother  Hypercholesterolemia  CA - Cancer  Comments:  9/19/2013 2:17 PM CDT - Mya Lopez  Recurring lymphoma     Procedure history:    Corneal transplant ([id number]) in the month of 11/2018 at 68 Years.  Comments:  1/17/2019 9:35 AM CST - Hao SAMMJessica  Right Eye  Microdiscectomy (424676945) on 11/12/2003 at 53 Years.  Comments:  11/30/2010 4:07 PM CST - GalinasamsonNela palafox  L5-S1  Colonoscopy (125777960) in 2000 at 50 Years.   Social History:        Alcohol Assessment: Current            1-2 times per week, 1 drinks/episode average.  Ready to change: No.                     Comments:                      09/03/2010 - Melanie Mcintyre LPN, very little      Tobacco Assessment: Denies Tobacco Use            Never      Substance Abuse Assessment: Denies Substance Abuse            Never      Employment and Education Assessment            Retired      Home and Environment Assessment            Marital status: .  Spouse/Partner name: Shaila.  Living situation: Home/Independent.               Injuries/Abuse/Neglect in household: No.  Feels unsafe at home: No.  Family/Friends available for support:               Yes.      Nutrition and Health Assessment            Type of diet: Regular.  Wants to lose weight: No.  Sleeping concerns: No.  Feels highly stressed: No.      Exercise and Physical Activity Assessment: Regular exercise            Exercise frequency: 5-6 times/week.  Exercise type: Walking.      Sexual Assessment            Sexually active: Yes.  Identifies as male, Sexual orientation: Straight or heterosexual.  History of STD: No.               Contraceptive Use Details: None.  History of sexual abuse: No.  ,        Alcohol Assessment: Current            1-2 times per week, 1 drinks/episode average.  Ready to change: No.                     Comments:                      09/03/2010 - Miguelina  LPN, Melanie                     occ, very little      Tobacco Assessment: Denies Tobacco Use            Never      Substance Abuse Assessment: Denies Substance Abuse            Never      Employment and Education Assessment            Retired      Home and Environment Assessment            Marital status: .  Spouse/Partner name: Shaila.  Living situation: Home/Independent.               Injuries/Abuse/Neglect in household: No.  Feels unsafe at home: No.  Family/Friends available for support:               Yes.      Nutrition and Health Assessment            Type of diet: Regular.  Wants to lose weight: No.  Sleeping concerns: No.  Feels highly stressed: No.      Exercise and Physical Activity Assessment: Regular exercise            Exercise frequency: 5-6 times/week.  Exercise type: Walking.      Sexual Assessment            Sexually active: Yes.  Identifies as male, Sexual orientation: Straight or heterosexual.  History of STD: No.               Contraceptive Use Details: None.  History of sexual abuse: No.        Physical Examination   EXAM AT PROVIDER DISCRETION        Vital Signs   10/28/2020 1:58 PM CDT Peripheral Pulse Rate 75 bpm    HR Method Electronic    Systolic Blood Pressure 136 mmHg  HI    Diastolic Blood Pressure 76 mmHg    Mean Arterial Pressure 96 mmHg    BP Site Right arm    BP Method Electronic      Measurements from flowsheet : Measurements   10/28/2020 1:58 PM CDT Height Measured - Standard 73 in    Weight Measured - Standard 223.8 lb    BSA 2.28 m2    Body Mass Index 29.52 kg/m2  HI      General:  Alert and oriented, No acute distress.    Eye:  Pupils are equal, round and reactive to light, Extraocular movements are intact, Normal conjunctiva.    HENT:  Normocephalic, Tympanic membranes are clear, Oral mucosa is moist.    Neck:  Supple, Non-tender, No lymphadenopathy.    Respiratory:  Lungs are clear to auscultation, Respirations are non-labored.    Cardiovascular:  Normal rate, Regular  rhythm.    Gastrointestinal:  Soft, Non-tender, No organomegaly.    Genitourinary:  No costovertebral angle tenderness.    Integumentary:  No rash, Fissure at base of left great toe.    Cognition and Speech:  Oriented, Speech clear and coherent, Functional cognition intact,     What is the year? 2020    What is the date? Oct 28    What city do you live in?   [place]       .    Psychiatric:  Cooperative.       Health Maintenance      Recommendations     Pending (in the next year)        OverDue           Tetanus Vaccine due  06/06/15  and every 10  year(s)           Type 2 Diabetes Mellitus Screen (Male) due  09/13/18  and every 1  year(s)           Depression Screen (Male) due  09/13/18  and every 1  year(s)           Colorectal Cancer Screen (Colonoscopy) (Male) due  09/25/18  and every 1  year(s)           Colorectal Cancer Screen (Occult Blood) (Male) due  09/25/18  and every 1  year(s)           Colorectal Cancer Screen (Sigmoidoscopy) (Male) due  09/25/18  and every 1  year(s)        Due            Alcohol Misuse Screen (Male) due  09/30/20  and every 1  year(s)           Abdominal Aortic Aneurysm Screen (if hx of smoking) due  10/28/20  One-time only           Aspirin Therapy for Prevention of CVD (Male) due  10/28/20  and every 5  year(s)           Fall Risk Screen (Male) due  10/28/20  and every 1  year(s)           HIV Screen (if sexually active) (Male) due  10/28/20  and every 1  year(s)           Hepatitis C Screen [id number] (Male) due  10/28/20  One-time only           Lung Cancer Screen (Male) due  10/28/20  and every 1  year(s)           STD Counseling (if sexually active) (Male) due  10/28/20  and every 1  year(s)           Syphilis Screen (if sexually active) (Male) due  10/28/20  and every 1  year(s)        Due In Future            Influenza Vaccine not due until  08/31/21  and every 1  year(s)           Lipid Disorders Screen (Male) not due until  09/15/21  and every 1  year(s)     Satisfied (in  the past 1 year)        Satisfied            Body Mass Index Check (Male) on  10/28/20.           Body Mass Index Check (Male) on  07/30/20.           High Blood Pressure Screen (Male) on  10/28/20.           High Blood Pressure Screen (Male) on  09/15/20.           High Blood Pressure Screen (Male) on  07/30/20.           High Blood Pressure Screen (Male) on  07/02/20.           High Blood Pressure Screen (Male) on  06/16/20.           Influenza Vaccine on  10/01/20.           Lipid Disorders Screen (Male) on  09/15/20.           Lipid Disorders Screen (Male) on  09/15/20.           Lipid Disorders Screen (Male) on  09/15/20.           Lipid Disorders Screen (Male) on  09/15/20.           Obesity Screen and Counseling (Male) on  10/28/20.           Obesity Screen and Counseling (Male) on  07/30/20.           Obesity Screen and Counseling (Male) on  06/16/20.           Tobacco Use Screen (Male) on  10/28/20.           Tobacco Use Screen (Male) on  07/30/20.           Tobacco Use Screen (Male) on  07/06/20.          Review / Management   Differential diagnosis   Results review:  Lab results   9/15/2020 11:27 AM CDT Sodium Level 140 mmol/L    Potassium Level 3.9 mmol/L    Chloride Level 106 mmol/L    CO2 Level 24 mmol/L    Glucose Level 101 mg/dL  HI    BUN 20 mg/dL    Creatinine Level 1.01 mg/dL    BUN/Creat Ratio NOT APPLICABLE    eGFR 76 mL/min/1.73m2    eGFR African American 88 mL/min/1.73m2    Calcium Level 9.4 mg/dL    Bilirubin Total 0.6 mg/dL    Alkaline Phosphatase 150 unit/L  HI    AST/SGOT 22 unit/L    ALT/SGPT 20 unit/L     unit/L    Protein Total 6.6 gm/dL    Albumin Level 4.6 gm/dL    Globulin 2.0    A/G Ratio 2.3    Cholesterol 153 mg/dL    Non-HDL Cholesterol 116    HDL 37 mg/dL  LOW    Cholesterol/HDL Ratio 4.1    LDL 89    Triglyceride 178 mg/dL  HI    PSA 4.4 ng/mL  HI    .1  HI    RBC 3.85  LOW    Hgb 12.0 gm/dL  LOW    Hct 38.0 %  LOW    MCV 98.7 fL    MCH 31.2 pg    MCHC 31.6 gm/dL   LOW    RDW 13.8 %    Platelet 165    MPV 11.5 fL    Lymphocytes 91.1 %    Abs Lymphocytes 116,699  HI    Neutrophils 2.6 %    Abs Neutrophils 3,331    Monocytes 5.3 %    Abs Monocytes 6,789  HI    Eosinophils 0.3 %    Abs Eosinophils 384    Basophils 0.7 %    Abs Basophils 897  HI    Differential Comment Few atypical lymphocytes noted Smudge cells present.   , INCLUDE PHQ 9.       Impression and Plan   Diagnosis     Hyperlipidemia NOS (PLF84-PX E78.5).     Dry skin dermatitis (OOT78-LN L85.3).     Left peroneal nerve palsy (HCY07-YN G57.32).     Annual physical exam (AWV47-GB Z00.00).     CLL (chronic lymphocytic leukemia) (IRG39-RA C91.90).     BPH (Benign Prostatic Hypertrophy) (DIX80-RC N40.0).     Lumbar disc herniation (RFH17-LT M51.26).     Course:  Progressing as expected.    Orders   Plan:  PLEASE LIST  PLAN FOR POSITIVE FINDINGS, TREATMENT OPTIONS, RISK VS BENEFITS.    Patient Instructions:       Counseled: Preventive service checklist reviewed, updated and copy given to patient.  Please see scanned document..    Counseled:  Patient, REFERRALS FOR PREVENTIVE SERVICES HEALTH EDUCATION (RD/CDE), and appropriate weight and diet, Discussed preventive services including, AAA (risk assessment, smoked 100 cigarettes in lifetime or family history), Lipid Screening, DM Screening, Nutrition, Bone Mass, Cancer Screening , immunization ( flu, pneumovax, hep B, Zostavax).  Glaucoma screening ..    Preventive service checklist reviewed, updated and copy given to patient.  Please see scanned document. Colon screen up to date. Needs in 2 years. Wants to wait on mildly elevated PSA until after PET scan. Will send to dermatology. He will check with oncology about getting Adacel updated.

## 2022-02-15 NOTE — LETTER
(Inserted Image. Unable to display)   September 06, 2019      BIBI SHAW  [address], [place] [id number]        Dear BIBI,      Thank you for selecting [facility] ([facility]viously [facility]) for your healthcare needs.     Our records indicate you are due for the following services:    Annual Physical  Fasting Lab Tests ~ Please do not eat or drink anything 10 hours prior to your scheduled appointment time.                (Water and any medications that you may need are allowed unless directed otherwise.)    If you had your labs done at another facility or with [facility] at [facility], please bring in a copy of the results to your next visit, mail a copy, or drop off a copy of your results to your Healthcare Provider.    You are due for lab work and an office visit; please schedule the lab appointment 1 week before the office visit.  This will assure all results are available to discuss with your provider during your visit.    **It is very helpful if you bring your medication bottles to your appointment.  This assures we have all of your current medications, including strength and dosing information, documented accurately in your medical record.    To schedule an appointment or if you have further questions, please contact your primary clinic:   [facility]  [phone]   [facility]  [phone]             [facility]      [phone]      Powered by [employer] and [employer]    Sincerely,    Bam Arnold PA-C

## 2022-02-15 NOTE — TELEPHONE ENCOUNTER
---------------------  From: Bam Arnold PA-C   To: BIBI SHAW    Sent: 11/2/2021 3:31:36 PM CDT  Subject: General Message       As we discussed. Your lipids are excellent. I will make the referral to Urology.    Results:  Date Result Name Ind Value Ref Range   11/1/2021 11:34 AM Cholesterol  160 mg/dL ( - <200)   11/1/2021 11:34 AM HDL  68 mg/dL (> OR = 40 - )   11/1/2021 11:34 AM Triglyceride  111 mg/dL ( - <150)   11/1/2021 11:34 AM LDL  73    11/1/2021 11:34 AM Cholesterol/HDL Ratio  2.4 ( - <5.0)   11/1/2021 11:34 AM Non-HDL Cholesterol  92 ( - <130)   11/1/2021 11:34 AM PSA ((H)) 8.37 ng/mL ( - < OR = 4.00)

## 2022-02-15 NOTE — NURSING NOTE
Medicare Visit Entered On:  9/30/2019 11:12 AM CDT    Performed On:  9/30/2019 10:57 AM CDT by Sunshine Harp CMA               Summary   Chief Complaint :   AWV   Weight Measured :   225.8 lb(Converted to: 225 lb 13 oz, 102.42 kg)    Height Measured :   73 in(Converted to: 6 ft 1 in, 185.42 cm)    Body Mass Index :   29.79 kg/m2 (HI)    Body Surface Area :   2.29 m2   Systolic Blood Pressure :   130 mmHg   Diastolic Blood Pressure :   76 mmHg   Mean Arterial Pressure :   94 mmHg   Peripheral Pulse Rate :   79 bpm   BP Site :   Left arm   BP Method :   Manual   HR Method :   Electronic   Oxygen Saturation :   100 %   Sunshine Harp CMA - 9/30/2019 10:57 AM CDT   Health Status   Allergies Verified? :   Yes   Medication History Verified? :   Yes   Medical History Verified? :   Yes   Pre-Visit Planning Status :   Completed   Tobacco Use? :   Never smoker   Sunshine Harp CMA - 9/30/2019 10:57 AM CDT   Consents   Consent for Immunization Exchange :   Consent Granted   Consent for Immunizations to Providers :   Consent Granted   Sunshine Harp CMA - 9/30/2019 10:57 AM CDT   Procedures / Surgeries        -    Procedure History   (As Of: 9/30/2019 11:12:43 AM CDT)     Procedure Dt/Tm:   2000 ; Anesthesia Minutes:   0 ; Procedure Name:   Colonoscopy ; Procedure Minutes:   0 ; Last Reviewed Dt/Tm:   9/30/2019 10:57:53 AM CDT            Procedure Dt/Tm:   11/12/2003 ; Anesthesia Minutes:   0 ; Procedure Name:   Microdiscectomy ; Procedure Minutes:   0 ; Comments:     11/30/2010 4:07 PM Nela West  L5-S1 ; Last Reviewed Dt/Tm:   9/30/2019 10:57:53 AM CDT            Procedure Dt/Tm:   11/2018 ; Anesthesia Minutes:   0 ; Procedure Name:   Corneal transplant ; Procedure Minutes:   0 ; Comments:     1/17/2019 9:35 AM Jessica Salmeron CMA  Right Eye ; Last Reviewed Dt/Tm:   9/30/2019 10:57:53 AM CDT            Meds / Allergies   (As Of: 9/30/2019 11:12:43 AM CDT)   Allergies (Active)   No Known Medication Allergies   Estimated Onset Date:   Unspecified ; Created By:   Jessica Bui CMA; Reaction Status:   Active ; Category:   Drug ; Substance:   No Known Medication Allergies ; Type:   Allergy ; Updated By:   Jessica Bui CMA; Reviewed Date:   9/30/2019 11:08 AM CDT        Medication List   (As Of: 9/30/2019 11:12:43 AM CDT)   Prescription/Discharge Order    atorvastatin  :   atorvastatin ; Status:   Prescribed ; Ordered As Mnemonic:   Lipitor 40 mg oral tablet ; Simple Display Line:   40 mg, 1 tab(s), PO, Daily, 90 tab(s), 3 Refill(s) ; Ordering Provider:   Bam Arnold PA-C; Catalog Code:   atorvastatin ; Order Dt/Tm:   3/26/2019 1:31:26 PM          finasteride  :   finasteride ; Status:   Prescribed ; Ordered As Mnemonic:   finasteride 5 mg oral tablet ; Simple Display Line:   5 mg, 1 tab(s), PO, Daily, 90 tab(s), 3 Refill(s) ; Ordering Provider:   Bam Arnold PA-C; Catalog Code:   finasteride ; Order Dt/Tm:   3/26/2019 1:31:27 PM            Home Meds    ascorbic acid  :   ascorbic acid ; Status:   Documented ; Ordered As Mnemonic:   Vitamin C ; Simple Display Line:   daily, 0 Refill(s) ; Catalog Code:   ascorbic acid ; Order Dt/Tm:   9/30/2019 11:11:34 AM          calcium-vitamin D  :   calcium-vitamin D ; Status:   Documented ; Ordered As Mnemonic:   calcium (as carbonate)-vitamin D 600 mg-125 intl units oral tablet ; Simple Display Line:   1 tab(s), Oral, tid, 270 tab(s), 0 Refill(s) ; Catalog Code:   calcium-vitamin D ; Order Dt/Tm:   9/30/2019 11:10:43 AM          cyanocobalamin  :   cyanocobalamin ; Status:   Documented ; Ordered As Mnemonic:   Vitamin B-12 ; Simple Display Line:   0 Refill(s) ; Catalog Code:   cyanocobalamin ; Order Dt/Tm:   9/30/2019 11:11:28 AM          cycloSPORINE ophthalmic  :   cycloSPORINE ophthalmic ; Status:   Documented ; Ordered As Mnemonic:   cycloSPORINE 0.1% ophthalmic emulsion ; Simple Display Line:   0 Refill(s) ; Catalog Code:   cycloSPORINE ophthalmic ; Order Dt/Tm:   9/30/2019  11:09:49 AM          echinacea  :   echinacea ; Status:   Documented ; Ordered As Mnemonic:   echinacea ; Simple Display Line:   0 Refill(s) ; Catalog Code:   echinacea ; Order Dt/Tm:   9/30/2019 11:10:11 AM          Miscellaneous Prescription  :   Miscellaneous Prescription ; Status:   Documented ; Ordered As Mnemonic:   cayenne ; Simple Display Line:   0 Refill(s) ; Catalog Code:   Miscellaneous Prescription ; Order Dt/Tm:   9/30/2019 11:11:19 AM          multivitamin  :   multivitamin ; Status:   Documented ; Ordered As Mnemonic:   Multi Vitamin+ ; Simple Display Line:   0 Refill(s) ; Catalog Code:   multivitamin ; Order Dt/Tm:   9/30/2019 11:10:02 AM          omega-3 polyunsaturated fatty acids  :   omega-3 polyunsaturated fatty acids ; Status:   Documented ; Ordered As Mnemonic:   Fish Oil ; Simple Display Line:   Oral, 0 Refill(s) ; Catalog Code:   omega-3 polyunsaturated fatty acids ; Order Dt/Tm:   9/30/2019 11:11:05 AM          prednisoLONE ophthalmic  :   prednisoLONE ophthalmic ; Status:   Documented ; Ordered As Mnemonic:   prednisoLONE acetate 1% ophthalmic suspension ; Simple Display Line:   0 Refill(s) ; Catalog Code:   prednisoLONE ophthalmic ; Order Dt/Tm:   2/4/2019 11:28:33 AM ; Comment:   Responsible Provider: ZACH KEENAN            Family History   Family History   (As Of: 9/30/2019 11:12:43 AM CDT)   Daughter: Carly  Full Name:   Carly ; Relation:   Daughter ; Gender:   Female ;  YOB: 1979 12:00:00 AM CST            Nomenclature:   Diabetes mellitus type I ; Value:   Positive          Son: Reji  Full Name:   Reji ; Relation:   Son ; Gender:   Male ;  YOB: 1981 12:00:00 AM CST            Nomenclature:   Diabetes mellitus ; Value:   Positive          Son: Arnie  Full Name:   Arnie ; Relation:   Son ; Gender:   Male ;  YOB: 1987 12:00:00 AM CST            Nomenclature:   Diabetes mellitus type I ; Value:   Positive          Brother:   Relation:    Brother ; Gender:   Male ;  YOB: 1946 12:00:00 AM CST            Nomenclature:   Arthritis ; Value:   Positive          Grandmother (M): General Family Hx  Full Name:   General Family Hx ; Relation:   Grandmother (M) ;           Nomenclature:   Heart disease ; Value:   Positive            Nomenclature:   Stroke ; Value:   Positive          Mother:   Relation:   Mother ; Gender:   Female ;           Nomenclature:   Hypercholesterolemia ; Value:   Positive            Nomenclature:   Cardiovascular Disease ; Value:   Positive          Brother:   Relation:   Brother ; Gender:   Male ;  YOB: 1945 12:00:00 AM CST            Nomenclature:   Hypercholesterolemia ; Value:   Positive            Nomenclature:   CA - Cancer ; Comments:   9/19/2013 2:17 PM CDT - Mya Lopez  Recurring lymphoma ; Value:   Positive          Brother:   Relation:   Brother ; Gender:   Male ;  YOB: 1958 12:00:00 AM CST            Nomenclature:   Hypercholesterolemia ; Value:   Positive            Social History   Social History   (As Of: 9/30/2019 11:12:43 AM CDT)   Alcohol:  Past      Past   Comments:  9/3/2010 3:24 PM - Melanie Mcintyre LPN: occ, very little   (Last Updated: 9/5/2018 10:48:43 AM CDT by Sunshine Harp CMA)          Tobacco:  Denies Tobacco Use      Never   (Last Updated: 9/6/2012 8:06:39 AM CDT by Mya Lopez)          Substance Abuse:  Denies Substance Abuse      Never   (Last Updated: 9/6/2012 8:06:45 AM CDT by Mya Lopez)          Employment/School:        Retired   (Last Updated: 9/13/2018 2:20:23 PM CDT by Mya Lopez)          Home/Environment:        Marital status: .  Spouse/Partner name: Chencho   (Last Updated: 9/6/2012 8:06:25 AM CDT by Mya Lopez)          Nutrition/Health:        Type of diet: Regular.   (Last Updated: 9/6/2012 8:07:03 AM CDT by Mya Lopez)          Exercise:  Regular exercise      Exercise frequency: 5-6 times/week.  Exercise type: Walking.    (Last Updated: 12/5/2018 1:38:42 PM CST by Mya Lopez)          Sexual:        Sexually active: Yes.  Sexual orientation: Straight or heterosexual.   (Last Updated: 9/22/2017 11:35:16 AM CDT by Mya Lopez)            Geriatric Depression Screening   Geriatric Depression Satisfied Life :   Yes   Geriatric Depression Dropped Activities :   No   Geriatric Depression Life Empty :   No   Geriatric Depression Bored :   Yes   Geriatric Depression Good Spirits :   Yes   Geriatric Depression Afraid Bad Things :   Yes   Geriatric Depression Feel Happy :   Yes   Geriatric Depression Feel Helpless :   No   Geriatric Depression Prefer to Stay Home :   Yes   Geriatric Depression Memory Problems :   No   Geriatric Depression Wonderful Be Alive :   Yes   Geriatric Depression Feel Worthless :   No   Geriatric Depression Situation Hopeless :   No   Geriatric Depression Others Better Off :   No   Geriatric Depression Full of Energy :   No   Geriatric Depression Total Score :   4    Sunshine Harp CMA - 9/30/2019 10:57 AM CDT   Hearing and Vision Screening   Audiogram Result Right Ear :   Pass   Audiogram Result Left Ear :   Fail   Hearing Screen Comments :   4000hz   Visual Acuity Evaluated Left Eye :   20/30   Visual Acuity Evaluated Right Eye :   20/30   Vision Test Type :   Snellen   Vision Screen Comments :   B 20/25, glasses   Sunshine Harp CMA - 9/30/2019 10:57 AM CDT   Home Safety Screen   Emergency Numbers Kept/Updated :   Yes   Aware of Smoking Dangers :   Yes   Smoke Alarms/Fire Extinguisher Available :   Yes   Household Members Fire Safety Knowledge :   Yes   Firearms Unloaded and Secure :   Yes   Floor Rugs Removed or Fastened :   No   Mats in Bathtub/Shower :   No   Stairway Rails or Banisters :   Yes   Outdoor Clutter Safety :   Yes   Indoor Clutter Safety :   No   Electrical Cord Safety :   Yes   Sunshine Harp CMA - 9/30/2019 10:57 AM CDT   Flores Fall Risk   History of Fall in Last 3 Months Flores :   No   Loyd  Sunshine QUIJANO - 9/30/2019 10:57 AM CDT   Functional Assessment   Focused Functional Assessment Grid   Bathing :   Independent (2)   Dressing :   Independent (2)   Toileting :   Independent (2)   Transferring Bed or Chair :   Independent (2)   Feeding :   Independent (2)   Sunshine Harp CMA 9/30/2019 10:57 AM CDT   Capable of Shopping :   Yes   Capable of Walking :   Yes   Capable of Housekeeping :   Yes   Capable of Managing Medications :   Yes   Capable of Handling Finances :   Yes   Sunshine Harp CMA - 9/30/2019 10:57 AM CDT

## 2022-02-15 NOTE — PROGRESS NOTES
Patient:   BIBI SHAW            MRN: [id number]            FIN: [id number]               Age:   69 years     Sex:  Male     :  1950   Associated Diagnoses:   Right ear impacted cerumen; Annual physical exam; Fuchs' corneal dystrophy; Hyperlipidemia NOS; CLL (chronic lymphocytic leukemia); Prostate cancer screening   Author:   Bam Arnold PA-C      Visit Information      Date of Service: 2019 10:46 am  Performing Location: [facility]  Encounter#: [id number]      Primary Care Provider (PCP):  Bam Arnold PA-C    NPI# [id number]      Referring Provider:  Bam Arnold PA-C    NPI# [id number]   Visit type:  Annual exam.    Accompanied by:  No one.    Source of history:  Self, Medical record.    History limitation:  None.       Chief Complaint   2019 10:57 AM CDT   AWV   Medicare Initial / Annual Preventative Visit      Well Adult History   Well Adult History             The patient presents for well adult exam.  ADL's and Safety were reviewed.  _No concerns _____ found.  Please see copy of scanned form.    I have reviewed with the patient the completed health risk assessment and health history form and we have agreed on the following plans for identified risk factors. __no concerns_____ found.  Please see copy of scanned form.   .  Swelling right side of neck. Life screening noted. Abnormal ROSEMARIE.        Review of Systems   Eye:  See Preventative Services Checklist for Vision/Glaucoma Test dates..    Ear/Nose/Mouth/Throat:  Hearing is evaluated..    Respiratory:  Negative.    Cardiovascular:  Negative.    Gastrointestinal:  Negative.    Genitourinary:  Nocturia stable..    Hematology/Lymphatics:  Negative.    Endocrine:  Negative.    Immunologic:  Negative.    Musculoskeletal:  Negative.    Integumentary:  Negative.    Psychiatric:  GDS  Noted.    See completed Health History for Review of Systems.      Health Status   Allergies:    Allergic Reactions (Selected)  No Known Medication  Allergies   Medications:  (Selected)   Prescriptions  Prescribed  Lipitor 40 mg oral tablet: = 1 tab(s) ( 40 mg ), PO, Daily, # 90 tab(s), 3 Refill(s), Type: Maintenance, Pharmacy: [id number] [place], 1 tab(s) Oral daily  finasteride 5 mg oral tablet: = 1 tab(s) ( 5 mg ), PO, Daily, # 90 tab(s), 3 Refill(s), Type: Maintenance, Pharmacy[id number] [place], 1 tab(s) Oral daily  Documented Medications  Documented  Fish Oil: Oral, 0 Refill(s), Type: Maintenance  Multi Vitamin+: 0 Refill(s), Type: Maintenance  Vitamin B-12: 0 Refill(s), Type: Maintenance  Vitamin C: daily, 0 Refill(s), Type: Maintenance  calcium (as carbonate)-vitamin D 600 mg-125 intl units oral tablet: 1 tab(s), Oral, tid, # 270 tab(s), 0 Refill(s), Type: Maintenance  cayenne: cayenne, 0 Refill(s), Type: Maintenance  cycloSPORINE 0.1% ophthalmic emulsion: 0 Refill(s), Type: Maintenance  echinacea: 0 Refill(s), Type: Maintenance  prednisoLONE acetate 1% ophthalmic suspension: 0 Refill(s), Type: Soft Stop   Problem list:    All Problems  Obese / ICD-9-.00 / Probable  Hyperlipidemia NOS / ICD-9-.4 / Confirmed  Fuchs' corneal dystrophy / SNOMED CT 983981795 / Confirmed  Ed (Erectile Dysfunction) / ICD-9-.84 / Confirmed  Common Peroneal Nerve Dysfunction / SNOMED CT 953003184 / Confirmed  CLL (chronic lymphocytic leukemia) / SNOMED CT 638366630 / Confirmed  BPH (Benign Prostatic Hypertrophy) / ICD-9-.00 / Confirmed  Resolved: Lumbar disc herniation / SNOMED CT 267828350  Resolved: Amputation of finger of left hand / SNOMED CT 684090215     Current Providers and Suppliers Noted in Demographic Area.      Histories   Past Medical History:    Active  Hyperlipidemia NOS (272.4)  Ed (Erectile Dysfunction) (607.84)  BPH (Benign Prostatic Hypertrophy) (600.00)  Common Peroneal Nerve Dysfunction (998316149)  Obese (278.00)  Resolved  Lumbar disc herniation (445577564): Onset in 2003 at 53 years.  Resolved.  Comments:  9/24/2015 CDT 12:11  PM CDT - Ama Gallo  Left L5-S1 with S1 radiculopathy.  Amputation of finger of left hand (428473720): Onset in 1977 at 27 years.  Resolved.  Comments:  9/24/2015 CDT 12:15 PM CDT - Ama Gallo  Index fiinger.   Family History:    Diabetes mellitus  Son (Reji)  Heart disease  Grandmother (M) (General Family Hx)  Diabetes mellitus type I  Daughter (Carly)  Son (Arnie)  CA - Cancer  Brother  Comments:  9/19/2013 2:17 PM CDT - Otis Lopezia  Recurring lymphoma  Arthritis  Brother  Stroke  Grandmother (M) (General Family Hx)  Hypercholesterolemia  Mother  Brother  Brother  Cardiovascular Disease  Mother     Procedure history:    Corneal transplant ([id number]) in the month of 11/2018 at 68 Years.  Comments:  1/17/2019 9:35 AM CST - Jessica Bui CMA  Right Eye  Microdiscectomy ([id number]) on 11/12/2003 at 53 Years.  Comments:  11/30/2010 4:07 PM CST - Nela Cage  L5-S1  Colonoscopy ([id number]) in 2000 at 50 Years.   Social History:        Alcohol Assessment: Past            Past                     Comments:                      09/03/2010 - Miguelina AZULN, Melanie                     occ, very little      Tobacco Assessment: Denies Tobacco Use            Never      Substance Abuse Assessment: Denies Substance Abuse            Never      Employment and Education Assessment            Retired      Home and Environment Assessment            Marital status: .  Spouse/Partner name: Shaila.      Nutrition and Health Assessment            Type of diet: Regular.      Exercise and Physical Activity Assessment: Regular exercise            Exercise frequency: 5-6 times/week.  Exercise type: Walking.      Sexual Assessment            Sexually active: Yes.  Sexual orientation: Straight or heterosexual.        Physical Examination   Exam at Provider Discretion   Vital Signs   9/30/2019 10:57 AM CDT Peripheral Pulse Rate 79 bpm    HR Method Electronic    Systolic Blood Pressure 130 mmHg    Diastolic Blood Pressure 76 mmHg     Mean Arterial Pressure 94 mmHg    BP Site Left arm    BP Method Manual    Oxygen Saturation 100 %      Measurements from flowsheet : Measurements   9/30/2019 10:57 AM CDT Height Measured - Standard 73 in    Weight Measured - Standard 225.8 lb    BSA 2.29 m2    Body Mass Index 29.79 kg/m2  HI      General:  Alert and oriented, No acute distress.    Eye:  Pupils are equal, round and reactive to light, Extraocular movements are intact, Normal conjunctiva.    HENT:  Normocephalic, Tympanic membranes are clear, Oral mucosa is moist, No pharyngeal erythema, Right ear cerumen impaction. Lavaged and curetted with good response. No complications. Afterwards, TM and canal normal..    Neck:  Supple, No thyromegaly.    Respiratory:  Lungs are clear to auscultation, Respirations are non-labored.    Cardiovascular:  Normal rate, Regular rhythm, No murmur.    Gastrointestinal:  Soft, Non-tender, Non-distended, Normal bowel sounds, No organomegaly.    Genitourinary:  No costovertebral angle tenderness.    Musculoskeletal:  Left foot drop, chronic and stable..    Neurologic:  Alert, No signs of cognitive impairment..    Psychiatric:  Patient's GDS and CAGE questionnaire reviewed and discussed with patient..       Health Maintenance      Recommendations     Pending (in the next year)        OverDue           Colorectal Cancer Screen (Colonoscopy) (Male) due  05/28/10  and every 10  year(s)           Colorectal Cancer Screen (Occult Blood) (Male) due  05/28/10  and every 10  year(s)           Colorectal Cancer Screen (Sigmoidoscopy) (Male) due  05/28/10  and every 10  year(s)           Tetanus Vaccine due  06/06/15  and every 10  year(s)           Depression Screen (Male) due  09/13/18  and every 1  year(s)        Due            Influenza Vaccine due  09/01/19  and every 1  year(s)           Alcohol Misuse Screen (Male) due  09/05/19  and every 1  year(s)           Abdominal Aortic Aneurysm Screen (if hx of smoking) due  09/30/19   One-time only           Aspirin Therapy for Prevention of CVD (Male) due  09/30/19  and every 5  year(s)           Fall Risk Screen (Male) due  09/30/19  and every 1  year(s)           HIV Screen (if sexually active) (Male) due  09/30/19  and every 1  year(s)           Hepatitis C Screen [id number] (Male) due  09/30/19  One-time only           Lung Cancer Screen (Male) due  09/30/19  and every 1  year(s)           STD Counseling (if sexually active) (Male) due  09/30/19  and every 1  year(s)           Syphilis Screen (if sexually active) (Male) due  09/30/19  and every 1  year(s)        Due In Future            Lipid Disorders Screen (Male) not due until  09/05/20  and every 1  year(s)     Satisfied (in the past 1 year)        Satisfied            Body Mass Index Check (Male) on  09/30/19.           Body Mass Index Check (Male) on  01/17/19.           Body Mass Index Check (Male) on  11/15/18.           High Blood Pressure Screen (Male) on  09/30/19.           High Blood Pressure Screen (Male) on  02/04/19.           High Blood Pressure Screen (Male) on  01/17/19.           High Blood Pressure Screen (Male) on  11/15/18.           Influenza Vaccine on  10/10/18.           Lipid Disorders Screen (Male) on  09/05/19.           Lipid Disorders Screen (Male) on  09/05/19.           Lipid Disorders Screen (Male) on  09/05/19.           Lipid Disorders Screen (Male) on  09/05/19.           Obesity Screen and Counseling (Male) on  09/30/19.           Obesity Screen and Counseling (Male) on  01/17/19.           Obesity Screen and Counseling (Male) on  11/15/18.           Tobacco Use Screen (Male) on  09/30/19.           Tobacco Use Screen (Male) on  02/04/19.           Tobacco Use Screen (Male) on  01/17/19.           Tobacco Use Screen (Male) on  11/15/18.          Review / Management   Results review:  Lab results   9/5/2019 11:06 AM CDT Sodium Level 140 mmol/L    Potassium Level 4.9 mmol/L    Chloride Level 104 mmol/L     CO2 Level 25 mmol/L    Glucose Level 103 mg/dL  HI    BUN 16 mg/dL    Creatinine Level 1.07 mg/dL    BUN/Creat Ratio NOT APPLICABLE    eGFR 71 mL/min/1.73m2    eGFR African American 83 mL/min/1.73m2    Calcium Level 9.8 mg/dL    Bilirubin Total 0.9 mg/dL    Alkaline Phosphatase 98 unit/L    AST/SGOT 22 unit/L    ALT/SGPT 28 unit/L     unit/L    Protein Total 6.9 gm/dL    Albumin Level 4.7 gm/dL    Globulin 2.2    A/G Ratio 2.1    Cholesterol 167 mg/dL    Non-HDL Cholesterol 123    HDL 44 mg/dL    Cholesterol/HDL Ratio 3.8    LDL 97    Triglyceride 158 mg/dL  HI    PSA 3.3 ng/mL    WBC 57.3  HI    RBC 4.57    Hgb 14.7 gm/dL    Hct 44.4 %    MCV 97.2 fL    MCH 32.2 pg    MCHC 33.1 gm/dL    RDW 12.5 %    Platelet 206    MPV 11.5 fL    Lymphocytes 94.9 %    Abs Lymphocytes 54,378  HI    Neutrophils 3.1 %    Abs Neutrophils 1,776    Monocytes 2.0 %    Abs Monocytes 1,146  HI    Eosinophils 0 %    Abs Eosinophils 0  LOW    Basophils 0 %    Abs Basophils 0    Differential Comment Few atypical lymphocytes noted Smudge cells present.    Differential Comment See comment   , INCLUDE PHQ 9.       Impression and Plan   Diagnosis     Right ear impacted cerumen (ZUF64-FO H61.21).     Annual physical exam (DZJ89-ZT Z00.00).     Fuchs' corneal dystrophy (WVA38-MZ H18.51).     Hyperlipidemia NOS (ITR95-RJ E78.5).     CLL (chronic lymphocytic leukemia) (ACE89-ON C91.90).     Prostate cancer screening (URD56-SM Z12.5).     Patient Instructions:       Counseled: Patient, Regarding diagnosis, Regarding medications, Verbalized understanding.    RTC in one year and PRN.      Professional Services   Counseling Summary:  Total time spent with the patient and coordination of care:  20 minutes. Cologuard set up..

## 2022-02-15 NOTE — TELEPHONE ENCOUNTER
---------------------  From: BIBI SHAW  To: [facility]  Sent: 08/16/2021 12:36 p.m. CDT  Subject: Covid booster shots  Are you offering the covid vaccine booster shots for immunocompromised patients like myself?---------------------  From: Didi Croft CMA ([employer] Pool ([id number]   To: [employer] ([phone]);     Sent: 8/16/2021 12:47:44 PM CDT  Subject: FW: Covid booster shots---------------------  From: Sunshine Harp CMA ([facility] Message Pool ([id number]   To: BIBI SHAW    Sent: 8/16/2021 1:47:59 PM CDT  Subject: RE: Covid booster shots     We are working on next steps for scheduling are immunocompromised patients. I will add you to our list and we will reach out when we know more.     Thanks

## 2022-02-15 NOTE — TELEPHONE ENCOUNTER
---------------------  From: BIBI SHAW  To: [facility]  Sent: 01/19/2021 12:08 p.m. CST  Subject: RE: Covid vaccine  I see I am eligible for the covid vaccine beginning Jan 25th.  Please let me know as soon as you have it for me.  Thanks.  ---------------------  From: Jacquelyn Alejo CMA (Phone Messages Pool ([id number]))  To: BIBI SHAW  Sent: 12/17/2020 11:14:04 AM CST  Subject: RE: Covid vaccine  ???  Shankar Deutsch,  ???  We do not have a date set on vaccine arrival. The best way to keep track is to sign up for our newsletter and you will get updates via email on that information once it's available.  ???  [email or web address]  ???  Kind Regards,  ???  SAMM Valladares  ???  ???  ---------------------  From: BIBI SHAW  To: [facility]  Sent: 12/17/2020 11:08 a.m. CST  Subject: Covid vaccine  when do you expect to have the covid vaccine available for cancer patients like myself and other high risk patients?---------------------  From: Didi Croft CMA (Phone Messages Pool ([id number]))   To: [facility] Message Pool ([id number] Top [employer]);     Sent: 1/19/2021 12:25:21 PM CST  Subject: FW: Covid vaccineCall the clinic and hit Option 3 for COVID vaccine updates---------------------  From: Sunshine Harp CMA ([employer] Message Pool ([id number] Top [employer]))   To: BIBI SHAW    Sent: 1/21/2021 8:26:22 AM CST  Subject: FW: Covid vaccine

## 2022-02-15 NOTE — TELEPHONE ENCOUNTER
---------------------  From: Dinora Aragon CMA ([address] Pool ([address] [place] [address]) )   To: [facility] Message Pool ([address] [place] [address]);     Sent: 8/24/2021 2:02:33 PM CDT  Subject: 3rd covid dose     This pt called the clinic to schedule a 3rd covid dose.  Will you please review chart and advise if 3rd dose should be given?  If so, please forward to appointment pool for scheduling.  Thank you.---------------------  From: Sunshine Harp CMA ([facility] Message Pool ([address] [place] [address]))   To: Bam Arnold PA-C;     Sent: 8/24/2021 2:03:30 PM CDT  Subject: FW: 3rd covid dose---------------------  From: Bam Arnold PA-C   To: [facility] Message Pool ([address] [place] [address]);     Sent: 8/24/2021 2:07:32 PM CDT  Subject: RE: 3rd covid dose     Third covid immunization indicated due to immune status.    KAH---------------------  From: Bam Arnold PA-C   To: Appointment Pool ([id number]_[place]);     Sent: 8/24/2021 2:07:40 PM CDT  Subject: FW: 3rd covid dose---------------------  From: Yvonne Solares (Appointment Pool ([id number]_[place]))   To: Bam Arnold PA-C;     Sent: 8/24/2021 4:18:54 PM CDT  Subject: RE: 3rd covid dose     pt already received at [facility]

## 2022-02-15 NOTE — NURSING NOTE
Comprehensive Intake Entered On:  2/26/2021 7:53 AM CST    Performed On:  2/26/2021 7:48 AM CST by Sunshine Harp CMA               Summary   Chief Complaint :   c/o intense pain in left foot, disrupting sleep, purple color in toes, some bleeding from red spots on foot   Advance Directive :   No   Weight Measured :   221.4 lb(Converted to: 221 lb 6 oz, 100.425 kg)    Height Measured :   73 in(Converted to: 6 ft 1 in, 185.42 cm)    Body Mass Index :   29.21 kg/m2 (HI)    Body Surface Area :   2.27 m2   Systolic Blood Pressure :   129 mmHg   Diastolic Blood Pressure :   68 mmHg   Mean Arterial Pressure :   88 mmHg   Peripheral Pulse Rate :   77 bpm   BP Site :   Right arm   BP Method :   Electronic   HR Method :   Electronic   Sunshine Harp CMA - 2/26/2021 7:48 AM CST   Health Status   Allergies Verified? :   Yes   Medication History Verified? :   Yes   Medical History Verified? :   Yes   Sunshine Harp CMA - 2/26/2021 7:48 AM CST   Consents   Consent for Immunization Exchange :   Consent Granted   Consent for Immunizations to Providers :   Consent Granted   Sunshine Harp CMA - 2/26/2021 7:48 AM CST   Meds / Allergies   (As Of: 2/26/2021 7:53:33 AM CST)   Allergies (Active)   No Known Medication Allergies  Estimated Onset Date:   Unspecified ; Created By:   Jessica Bui CMA; Reaction Status:   Active ; Category:   Drug ; Substance:   No Known Medication Allergies ; Type:   Allergy ; Updated By:   Jessica Bui CMA; Reviewed Date:   2/26/2021 7:51 AM CST        Medication List   (As Of: 2/26/2021 7:53:33 AM CST)   Prescription/Discharge Order    triamcinolone topical  :   triamcinolone topical ; Status:   Prescribed ; Ordered As Mnemonic:   triamcinolone 0.1% topical cream ; Simple Display Line:   See Instructions, APPLY TO AFFECTED AREA TWICE A DAY, 30 gm, 0 Refill(s) ; Ordering Provider:   Bam Arnold PA-C; Catalog Code:   triamcinolone topical ; Order Dt/Tm:   1/5/2021 8:53:55 AM CST          finasteride  :    finasteride ; Status:   Prescribed ; Ordered As Mnemonic:   finasteride 5 mg oral tablet ; Simple Display Line:   5 mg, 1 tab(s), PO, Daily, 90 tab(s), 3 Refill(s) ; Ordering Provider:   Bam Arnold PA-C; Catalog Code:   finasteride ; Order Dt/Tm:   10/29/2020 12:19:19 PM CDT          atorvastatin  :   atorvastatin ; Status:   Prescribed ; Ordered As Mnemonic:   Lipitor 40 mg oral tablet ; Simple Display Line:   40 mg, 1 tab(s), PO, Daily, 90 tab(s), 3 Refill(s) ; Ordering Provider:   Bam Arnold PA-C; Catalog Code:   atorvastatin ; Order Dt/Tm:   10/29/2020 12:19:20 PM CDT          cyclobenzaprine  :   cyclobenzaprine ; Status:   Prescribed ; Ordered As Mnemonic:   cyclobenzaprine 10 mg oral tablet ; Simple Display Line:   10 mg, 1 tab(s), Oral, tid, PRN: for spasm, 30 tab(s), 0 Refill(s) ; Ordering Provider:   Bam Arnold PA-C; Catalog Code:   cyclobenzaprine ; Order Dt/Tm:   7/28/2020 1:07:28 PM CDT            Home Meds    ferrous sulfate  :   ferrous sulfate ; Status:   Documented ; Ordered As Mnemonic:   ferrous sulfate ; Simple Display Line:   Oral, 0 Refill(s) ; Catalog Code:   ferrous sulfate ; Order Dt/Tm:   10/28/2020 2:01:47 PM CDT          omega-3 polyunsaturated fatty acids  :   omega-3 polyunsaturated fatty acids ; Status:   Documented ; Ordered As Mnemonic:   Fish Oil ; Simple Display Line:   Oral, 0 Refill(s) ; Catalog Code:   omega-3 polyunsaturated fatty acids ; Order Dt/Tm:   9/30/2019 11:11:05 AM CDT          ascorbic acid  :   ascorbic acid ; Status:   Documented ; Ordered As Mnemonic:   Vitamin C ; Simple Display Line:   daily, 0 Refill(s) ; Catalog Code:   ascorbic acid ; Order Dt/Tm:   9/30/2019 11:11:34 AM CDT          Miscellaneous Prescription  :   Miscellaneous Prescription ; Status:   Documented ; Ordered As Mnemonic:   cayenne ; Simple Display Line:   0 Refill(s) ; Catalog Code:   Miscellaneous Prescription ; Order Dt/Tm:   9/30/2019 11:11:19 AM CDT          calcium-vitamin D   :   calcium-vitamin D ; Status:   Documented ; Ordered As Mnemonic:   calcium (as carbonate)-vitamin D 600 mg-125 intl units oral tablet ; Simple Display Line:   1 tab(s), Oral, tid, 270 tab(s), 0 Refill(s) ; Catalog Code:   calcium-vitamin D ; Order Dt/Tm:   9/30/2019 11:10:43 AM CDT          prednisoLONE ophthalmic  :   prednisoLONE ophthalmic ; Status:   Documented ; Ordered As Mnemonic:   prednisoLONE acetate 1% ophthalmic suspension ; Simple Display Line:   0 Refill(s) ; Catalog Code:   prednisoLONE ophthalmic ; Order Dt/Tm:   2/4/2019 11:28:33 AM CST ; Comment:   Responsible Provider: ZACH KEENAN          cyanocobalamin  :   cyanocobalamin ; Status:   Documented ; Ordered As Mnemonic:   Vitamin B-12 ; Simple Display Line:   0 Refill(s) ; Catalog Code:   cyanocobalamin ; Order Dt/Tm:   9/30/2019 11:11:28 AM CDT          echinacea  :   echinacea ; Status:   Documented ; Ordered As Mnemonic:   echinacea ; Simple Display Line:   0 Refill(s) ; Catalog Code:   echinacea ; Order Dt/Tm:   9/30/2019 11:10:11 AM CDT          multivitamin  :   multivitamin ; Status:   Documented ; Ordered As Mnemonic:   Multi Vitamin+ ; Simple Display Line:   0 Refill(s) ; Catalog Code:   multivitamin ; Order Dt/Tm:   9/30/2019 11:10:02 AM CDT          cycloSPORINE ophthalmic  :   cycloSPORINE ophthalmic ; Status:   Documented ; Ordered As Mnemonic:   cycloSPORINE 0.1% ophthalmic emulsion ; Simple Display Line:   0 Refill(s) ; Catalog Code:   cycloSPORINE ophthalmic ; Order Dt/Tm:   9/30/2019 11:09:49 AM CDT          acyclovir  :   acyclovir ; Status:   Documented ; Ordered As Mnemonic:   acyclovir 800 mg oral tablet ; Simple Display Line:   800 mg, 1 tab(s), Oral, daily, 0 Refill(s) ; Catalog Code:   acyclovir ; Order Dt/Tm:   7/6/2020 8:36:33 AM CDT            ID Risk Screen   Recent Travel History :   No recent travel   Family Member Travel History :   No recent travel   Other Exposure to Infectious Disease :   Unknown    COVID-19 Testing Status :   No COVID-19 test performed   Sunshine Harp CMA - 2/26/2021 7:48 AM CST   Social History   Social History   (As Of: 2/26/2021 7:53:33 AM CST)   Alcohol:  Past      Past, 1-2 times per week, 1 drinks/episode average.  Ready to change: No.   Comments:  9/3/2010 3:24 PM - Melanie Mcintyre LPN: occ, very little   (Last Updated: 10/28/2020 2:36:25 PM CDT by Sunshine Harp CMA)          Tobacco:  Denies Tobacco Use      Never   (Last Updated: 9/6/2012 8:06:39 AM CDT by Mya Lopez)   Never (less than 100 in lifetime)   (Last Updated: 2/26/2021 7:49:11 AM CST by Sunshine Harp CMA)          Electronic Cigarette/Vaping:        Electronic Cigarette Use: Never.   (Last Updated: 2/26/2021 7:49:16 AM CST by Sunshine Harp CMA)          Substance Abuse:  Denies Substance Abuse      Never   (Last Updated: 9/6/2012 8:06:45 AM CDT by Mya Lopez)          Employment/School:        Retired   (Last Updated: 9/13/2018 2:20:23 PM CDT by Mya Lopez)          Home/Environment:        Marital status: .  Spouse/Partner name: Shaila.  Living situation: Home/Independent.  Injuries/Abuse/Neglect in household: No.  Feels unsafe at home: No.  Family/Friends available for support: Yes.   (Last Updated: 4/9/2020 11:48:44 AM CDT by Mya Lopez)          Nutrition/Health:        Type of diet: Regular.  Wants to lose weight: Yes.  Sleeping concerns: No.  Feels highly stressed: Yes.   (Last Updated: 11/10/2020 10:45:21 AM CST by Mya Lopez)          Exercise:  Occasional exercise      Exercise frequency: 1-2 times/week.  Exercise type: Walking.   (Last Updated: 11/10/2020 10:45:37 AM CST by Mya Lopez)          Sexual:        Sexually active: Yes.  Identifies as male, Sexual orientation: Straight or heterosexual.  History of STD: No.  Contraceptive Use Details: None.  History of sexual abuse: No.   (Last Updated: 4/9/2020 11:49:12 AM CDT by Mya Lopez)

## 2022-02-15 NOTE — TELEPHONE ENCOUNTER
---------------------  From: Bam Arnold PA-C   To: Referral Coordinators Pool ([id number]_[facility]);     Sent: 11/2/2021 3:32:40 PM CDT  Subject: General Message     Urology referral    KAH

## 2022-03-02 NOTE — TELEPHONE ENCOUNTER
---------------------  From: Jazmin Cooper CMA (Phone Messages Pool ([id number]))   To: Swathi Sanchez;     Sent: 1/20/2022 2:33:35 PM CST !  Subject: FW: Test results           ---------------------  From: BIBI SHAW  To: [facility]  Sent: 01/20/2022 02:30 p.m. CST  Subject: Test results  I haven t received any test results from a urine analysis test ran yesterday. Result also need to be sent to [facility] as soon as possible to determine if I might require a prescription prior to a scheduled prostrate biopsy.---------------------  From: Swathi Sanchez   To: Phone Messages Pool ([id number]_[place]);     Sent: 1/20/2022 3:19:17 PM CST  Subject: RE: Test results     Results are not back yet and not have been informed to give results to patient.  Only were to be faxed to [facility] once resulted.---------------------  From: Jazmin Cooper CMA (Phone Messages Pool ([id number]))   To: BIBI SHAW    Sent: 1/20/2022 3:29:32 PM CST  Subject: FW: Test results---------------------  From: Swathi Sanchez   To: Phone Playroom Pool ([id number]);     Sent: 1/21/2022 9:15:36 AM CST  Subject: RE: Test results     Results were faxed to [facility]/Dr. Yara Booth @ [phone] by [employer] this morning.  I will also fax again to assure they have the report to give the patient the results.Faxed 1/19/2022 labs to Dr. Yara Booth/[facility] @ [phone] - confirmation received @ 9:17 AM.

## 2022-03-02 NOTE — TELEPHONE ENCOUNTER
---------------------  From: Candy Irby LPN (Phone Messages Pool ([id number]_[facility]))   To: INR Pool ( [id number]_[facility]);     Sent: 1/18/2022 2:14:00 PM CST  Subject: outside orders     Phone Message    PCP:   KAH      Time of Call:  1:22pm       Person Calling:  pt  Phone number:  [phone]    Note:   Pt LM stating we should be receiving orders from [facility] in [facility] for some tests that they want done either today or tomorrow.    Pt wanting to schedule.    Nothing in chart yet- have any orders been received?    Last office visit and reason:  11/1/21 [facility] Medical[facility] called, inquiring if results were received from [facility] for UA and culture.   Orders received, pt notified and was transferred to scheduling for lab appt.

## 2022-09-22 ENCOUNTER — TELEPHONE (OUTPATIENT)
Dept: FAMILY MEDICINE | Facility: CLINIC | Age: 72
End: 2022-09-22

## 2022-09-22 NOTE — TELEPHONE ENCOUNTER
Pt arrived to clinic with chest pressure, feeling short of breath, 'not feeling right'. Pt stated he woke at 8am with these sxs and drove to the clinic for evaluation. Writer met pt in the lobby; pt's eyes are closed and is able to report sxs. Vitals obtained: 100%, 72, 144/79, 18. Ambulance called, will transport to [facility]. Pt's wife notified of pt's status and being transported to [facility].

## 2022-11-13 DIAGNOSIS — E78.2 MIXED HYPERLIPIDEMIA: Primary | ICD-10-CM

## 2022-11-14 RX ORDER — ATORVASTATIN CALCIUM 40 MG/1
TABLET, FILM COATED ORAL
Qty: 90 TABLET | Refills: 3 | Status: SHIPPED | OUTPATIENT
Start: 2022-11-14

## 2022-11-14 NOTE — TELEPHONE ENCOUNTER
Routing to provider as refill request for review/approval because:  Labs not current:    LDL Cholesterol Calculated   Date Value Ref Range Status   11/01/2021 73  Final     Comment:     Reference range: <100     Desirable range <100 mg/dL for primary prevention;    <70 mg/dL for patients with CHD or diabetic patients   with > or = 2 CHD risk factors.     LDL-C is now calculated using the Julia   calculation, which is a validated novel method providing   better accuracy than the Friedewald equation in the   estimation of LDL-C.   Boy SS et al. SARAHY. 2013;310(19): 1257-3986   (http://education.CrossCurrent/faq/FSD557)  Lab test performed by:  Lab Mnemonic:JUSTIN  [email or web address]  [address] [place] [address]  Eliel Siu M.D.  Unit of Measure:   mg/dL (calc)  [facility] Specimen received date and time: 02-NOV-2021 08:44:00.00       LDL Cholesterol Direct   Date Value Ref Range Status   08/28/2014 96 <130 mg/dL Final     Comment:        Desirable range <100 mg/dL for patients with CHD or  diabetes and <70 mg/dL for diabetic patients with  known heart disease.       Lab test performed by:  Lab Mnemonic:JUSTIN  [email or web address]  [address] [place] [address]  Eliel Siu M.D.       Last office visit over a year ago: 11/01/2021  Medication not on active med list, but was able to find from your last note on Nov. 1st, 2021      Destiny Barillas RN  [facility]

## 2022-11-17 ASSESSMENT — ENCOUNTER SYMPTOMS
ARTHRALGIAS: 0
DYSURIA: 0
FEVER: 0
EYE PAIN: 0
NAUSEA: 0
CONSTIPATION: 0
NERVOUS/ANXIOUS: 0
HEARTBURN: 0
PALPITATIONS: 0
CHILLS: 0
MYALGIAS: 1
ABDOMINAL PAIN: 0
JOINT SWELLING: 0
SHORTNESS OF BREATH: 0
PARESTHESIAS: 0
HEMATOCHEZIA: 0
DIARRHEA: 0
HEMATURIA: 0
FREQUENCY: 0
WEAKNESS: 0
SORE THROAT: 0
HEADACHES: 0
COUGH: 0
DIZZINESS: 0

## 2022-11-17 ASSESSMENT — ACTIVITIES OF DAILY LIVING (ADL): CURRENT_FUNCTION: NO ASSISTANCE NEEDED

## 2022-11-23 ENCOUNTER — OFFICE VISIT (OUTPATIENT)
Dept: FAMILY MEDICINE | Facility: CLINIC | Age: 72
End: 2022-11-23
Payer: MEDICARE

## 2022-11-23 VITALS
BODY MASS INDEX: 29.82 KG/M2 | TEMPERATURE: 97.1 F | SYSTOLIC BLOOD PRESSURE: 121 MMHG | DIASTOLIC BLOOD PRESSURE: 80 MMHG | WEIGHT: 225 LBS | RESPIRATION RATE: 18 BRPM | OXYGEN SATURATION: 97 % | HEART RATE: 72 BPM | HEIGHT: 73 IN

## 2022-11-23 DIAGNOSIS — Z12.11 COLON CANCER SCREENING: ICD-10-CM

## 2022-11-23 DIAGNOSIS — Z00.00 ENCOUNTER FOR MEDICARE ANNUAL WELLNESS EXAM: Primary | ICD-10-CM

## 2022-11-23 DIAGNOSIS — E78.2 MIXED HYPERLIPIDEMIA: ICD-10-CM

## 2022-11-23 DIAGNOSIS — C91.10 CHRONIC LYMPHOCYTIC LEUKEMIA (H): ICD-10-CM

## 2022-11-23 DIAGNOSIS — B02.30 HERPESVIRAL OCULAR DISEASE: ICD-10-CM

## 2022-11-23 DIAGNOSIS — L85.3 DRY SKIN DERMATITIS: ICD-10-CM

## 2022-11-23 DIAGNOSIS — H18.513 FUCHS' CORNEAL DYSTROPHY OF BOTH EYES: ICD-10-CM

## 2022-11-23 DIAGNOSIS — S84.12XS: ICD-10-CM

## 2022-11-23 PROBLEM — R11.0 NAUSEA: Status: ACTIVE | Noted: 2022-01-17

## 2022-11-23 PROBLEM — R39.198 SLOWING OF URINARY STREAM: Status: ACTIVE | Noted: 2021-12-22

## 2022-11-23 PROBLEM — R39.11 URINARY HESITANCY: Status: ACTIVE | Noted: 2021-12-22

## 2022-11-23 PROBLEM — B35.1 ONYCHOMYCOSIS: Status: ACTIVE | Noted: 2022-01-17

## 2022-11-23 PROBLEM — H18.519 FUCHS' CORNEAL DYSTROPHY: Status: ACTIVE | Noted: 2022-11-23

## 2022-11-23 PROBLEM — L98.9 DISORDER OF THE SKIN AND SUBCUTANEOUS TISSUE, UNSPECIFIED: Status: ACTIVE | Noted: 2022-01-17

## 2022-11-23 PROBLEM — R79.89 ABNORMAL LIVER FUNCTION TEST: Status: ACTIVE | Noted: 2022-09-26

## 2022-11-23 PROBLEM — N40.0 BENIGN PROSTATIC HYPERPLASIA WITHOUT URINARY OBSTRUCTION: Status: ACTIVE | Noted: 2022-11-23

## 2022-11-23 PROBLEM — C61 PRIMARY MALIGNANT NEOPLASM OF PROSTATE (H): Status: ACTIVE | Noted: 2022-02-23

## 2022-11-23 PROBLEM — E78.5 DYSLIPIDEMIA: Status: ACTIVE | Noted: 2022-11-23

## 2022-11-23 PROBLEM — R74.01 HIGH ASPARTATE AMINOTRANSFERASE LEVEL: Status: ACTIVE | Noted: 2022-01-17

## 2022-11-23 PROBLEM — N20.0 NEPHROLITHIASIS: Status: ACTIVE | Noted: 2022-11-16

## 2022-11-23 PROBLEM — M54.50 LOW BACK PAIN: Status: ACTIVE | Noted: 2021-05-05

## 2022-11-23 PROBLEM — S84.10XA TRAUMATIC INJURY OF COMMON PERONEAL NERVE: Status: ACTIVE | Noted: 2022-11-23

## 2022-11-23 PROBLEM — E78.5 HYPERLIPIDEMIA: Status: ACTIVE | Noted: 2022-11-23

## 2022-11-23 PROBLEM — N52.9 ERECTILE DYSFUNCTION: Status: ACTIVE | Noted: 2022-11-23

## 2022-11-23 PROCEDURE — G0439 PPPS, SUBSEQ VISIT: HCPCS | Performed by: PHYSICIAN ASSISTANT

## 2022-11-23 RX ORDER — ACYCLOVIR 400 MG/1
400 TABLET ORAL 2 TIMES DAILY
Qty: 180 TABLET | Refills: 3 | Status: SHIPPED | OUTPATIENT
Start: 2022-11-23

## 2022-11-23 RX ORDER — FLUOCINONIDE TOPICAL SOLUTION USP, 0.05% 0.5 MG/ML
SOLUTION TOPICAL
COMMUNITY
Start: 2022-07-22

## 2022-11-23 RX ORDER — TRIAMCINOLONE ACETONIDE 1 MG/G
CREAM TOPICAL 2 TIMES DAILY
Qty: 80 G | Refills: 1 | Status: SHIPPED | OUTPATIENT
Start: 2022-11-23

## 2022-11-23 RX ORDER — PREDNISOLONE ACETATE 10 MG/ML
SUSPENSION/ DROPS OPHTHALMIC
COMMUNITY
Start: 2022-10-29

## 2022-11-23 RX ORDER — ACYCLOVIR 400 MG/1
400 TABLET ORAL 2 TIMES DAILY
COMMUNITY
Start: 2022-09-06 | End: 2022-11-23

## 2022-11-23 RX ORDER — CYCLOSPORINE 0.5 MG/ML
0.1 EMULSION OPHTHALMIC
COMMUNITY

## 2022-11-23 ASSESSMENT — ENCOUNTER SYMPTOMS
MYALGIAS: 1
EYE PAIN: 0
SORE THROAT: 0
HEARTBURN: 0
PALPITATIONS: 0
FREQUENCY: 0
DIARRHEA: 0
WEAKNESS: 0
DIZZINESS: 0
FEVER: 0
DYSURIA: 0
PARESTHESIAS: 0
HEADACHES: 0
CONSTIPATION: 0
CHILLS: 0
ARTHRALGIAS: 0
ABDOMINAL PAIN: 0
HEMATURIA: 0
JOINT SWELLING: 0
COUGH: 0
HEMATOCHEZIA: 0
SHORTNESS OF BREATH: 0
NERVOUS/ANXIOUS: 0
NAUSEA: 0

## 2022-11-23 ASSESSMENT — ACTIVITIES OF DAILY LIVING (ADL): CURRENT_FUNCTION: NO ASSISTANCE NEEDED

## 2022-11-23 NOTE — PATIENT INSTRUCTIONS
Patient Education   Personalized Prevention Plan  You are due for the preventive services outlined below.  Your care team is available to assist you in scheduling these services.  If you have already completed any of these items, please share that information with your care team to update in your medical record.  Health Maintenance Due   Topic Date Due     ANNUAL REVIEW OF HM ORDERS  Never done     Colorectal Cancer Screening  Never done     Hepatitis C Screening  Never done     Diptheria Tetanus Pertussis (DTAP/TDAP/TD) Vaccine (1 - Tdap) 06/07/2005     AORTIC ANEURYSM SCREENING (SYSTEM ASSIGNED)  Never done     Annual Wellness Visit  11/01/2022       Understanding USDA MyPlate  The USDA has guidelines to help you make healthy food choices. These are called MyPlate. MyPlate shows the food groups that make up healthy meals using the image of a place setting. Before you eat, think about the healthiest choices for what to put on your plate or in your cup or bowl. To learn more about building a healthy plate, visit www.choosemyplate.gov.    The food groups    Fruits. Any fruit or 100% fruit juice counts as part of the Fruit Group. Fruits may be fresh, canned, frozen, or dried, and may be whole, cut-up, or pureed. Make 1/2 of your plate fruits and vegetables.    Vegetables. Any vegetable or 100% vegetable juice counts as a member of the Vegetable Group. Vegetables may be fresh, frozen, canned, or dried. They can be served raw or cooked and may be whole, cut-up, or mashed. Make 1/2 of your plate fruits and vegetables.    Grains. All foods made from grains are part of the Grains Group. These include wheat, rice, oats, cornmeal, and barley. Grains are often used to make foods such as bread, pasta, oatmeal, cereal, tortillas, and grits. Grains should be no more than 1/4 of your plate. At least half of your grains should be whole grains.    Protein. This group includes meat, poultry, seafood, beans and peas, eggs,  processed soy products (such as tofu), nuts (including nut butters), and seeds. Make protein choices no more than 1/4 of your plate. Meat and poultry choices should be lean or low fat.    Dairy. The Dairy Group includes all fluid milk products and foods made from milk that contain calcium, such as yogurt and cheese. (Foods that have little calcium, such as cream, butter, and cream cheese, are not part of this group.) Most dairy choices should be low-fat or fat-free.    Oils. Oils aren't a food group, but they do contain essential nutrients. However it's important to watch your intake of oils. These are fats that are liquid at room temperature. They include canola, corn, olive, soybean, vegetable, and sunflower oil. Foods that are mainly oil include mayonnaise, certain salad dressings, and soft margarines. You likely already get your daily oil allowance from the foods you eat.  Things to limit  Eating healthy also means limiting these things in your diet:       Salt (sodium). Many processed foods have a lot of sodium. To keep sodium intake down, eat fresh vegetables, meats, poultry, and seafood when possible. Purchase low-sodium, reduced-sodium, or no-salt-added food products at the store. And don't add salt to your meals at home. Instead, season them with herbs and spices such as dill, oregano, cumin, and paprika. Or try adding flavor with lemon or lime zest and juice.    Saturated fat. Saturated fats are most often found in animal products such as beef, pork, and chicken. They are often solid at room temperature, such as butter. To reduce your saturated fat intake, choose leaner cuts of meat and poultry. And try healthier cooking methods such as grilling, broiling, roasting, or baking. For a simple lower-fat swap, use plain nonfat yogurt instead of mayonnaise when making potato salad or macaroni salad.    Added sugars. These are sugars added to foods. They are in foods such as ice cream, candy, soda, fruit drinks,  sports drinks, energy drinks, cookies, pastries, jams, and syrups. Cut down on added sugars by sharing sweet treats with a family member or friend. You can also choose fruit for dessert, and drink water or other unsweetened beverages.     [employer] last reviewed this educational content on 6/1/2020 2000-2021 [employer]. All rights reserved. This information is not intended as a substitute for professional medical care. Always follow your healthcare professional's instructions.          Signs of Hearing Loss      Hearing much better with one ear can be a sign of hearing loss.   Hearing loss is a problem shared by many people. In fact, it is one of the most common health problems, particularly as people age. Most people age 65 and older have some hearing loss. By age 80, almost everyone does. Hearing loss often occurs slowly over the years. So you may not realize your hearing has gotten worse.  Have your hearing checked  Call your healthcare provider if you:    Have to strain to hear normal conversation    Have to watch other people s faces very carefully to follow what they re saying    Need to ask people to repeat what they ve said    Often misunderstand what people are saying    Turn the volume of the television or radio up so high that others complain    Feel that people are mumbling when they re talking to you    Find that the effort to hear leaves you feeling tired and irritated    Notice, when using the phone, that you hear better with one ear than the other  [employer] last reviewed this educational content on 1/1/2020 2000-2021 [employer]. All rights reserved. This information is not intended as a substitute for professional medical care. Always follow your healthcare professional's instructions.

## 2022-11-23 NOTE — PROGRESS NOTES
"SUBJECTIVE:   Get is a 72 year old who presents for Preventive Visit.    Patient has been advised of split billing requirements and indicates understanding: Yes  Are you in the first 12 months of your Medicare coverage?  No    72-year-old male presents the clinic for annual wellness visit.  Patient's labs are up-to-date  Since I saw him last year in addition to his CLL diagnosis he has been diagnosed with prostate cancer he did complete a course of radiation and for now that is all the treatment that has been recommended.  He did have an episode in September where he had chest pain it was thought that perhaps he was having a transient small bowel torsion causing his symptoms nothing else checked out unremarkable it did not seem to be cardiac in etiology    Healthy Habits:     In general, how would you rate your overall health?  Good    Frequency of exercise:  4-5 days/week    Duration of exercise:  30-45 minutes    Do you usually eat at least 4 servings of fruit and vegetables a day, include whole grains    & fiber and avoid regularly eating high fat or \"junk\" foods?  No    Taking medications regularly:  Yes    Ability to successfully perform activities of daily living:  No assistance needed    Home Safety:  Throw rugs in the hallway and lack of grab bars in the bathroom    Hearing Impairment:  Difficulty understanding soft or whispered speech    In the past 6 months, have you been bothered by leaking of urine?  No    In general, how would you rate your overall mental or emotional health?  Good      PHQ-2 Total Score: 0    Additional concerns today:  No      Have you ever done Advance Care Planning? (For example, a Health Directive, POLST, or a discussion with a medical provider or your loved ones about your wishes): No, advance care planning information given to patient to review.  Patient declined advance care planning discussion at this time.      Right Ear:    1000 Hz: RESPONSE- on Level: 20db   2000 Hz: " RESPONSE- on Level: 20db   4000 Hz: RESPONSE- on Level: 20db    Left Ear:      4000 Hz: RESPONSE- on Level: 20db   2000 Hz: RESPONSE- on Level: 20db   1000 Hz: RESPONSE- on Level: 20db    500 Hz: RESPONSE- on Level: 25db    Right Ear:    500 Hz: RESPONSE- on Level: 25db    Hearing Acuity: Pass    Hearing Assessment:    Fall risk  Fallen 2 or more times in the past year?: No  Any fall with injury in the past year?: No    Cognitive Screening   1) Repeat 3 items (Leader, Season, Table)    2) Clock draw: NORMAL  3) 3 item recall: Recalls 2 objects   Results: NORMAL clock, 1-2 items recalled: COGNITIVE IMPAIRMENT LESS LIKELY    Mini-CogTM Copyright S Vasiliy. Licensed by the author for use in [facility]; reprinted with permission (deja@[facility]). All rights reserved.      Do you have sleep apnea, excessive snoring or daytime drowsiness?: no    Reviewed and updated as needed this visit by clinical staff   Tobacco  Allergies  Meds              Reviewed and updated as needed this visit by Provider                 Social History     Tobacco Use     Smoking status: Never     Smokeless tobacco: Never   Substance Use Topics     Alcohol use: Yes     Comment: Social         Alcohol Use 11/17/2022   Prescreen: >3 drinks/day or >7 drinks/week? No         Current providers sharing in care for this patient include:   Patient Care Team:  Bam Arnold PA as PCP - General (Family Medicine)  Lorenzo Claudio MD as Assigned PCP    The following health maintenance items are reviewed in Epic and correct as of today:  Health Maintenance   Topic Date Due     ANNUAL REVIEW OF HM ORDERS  Never done     ADVANCE CARE PLANNING  Never done     COLORECTAL CANCER SCREENING  Never done     HEPATITIS C SCREENING  Never done     DTAP/TDAP/TD IMMUNIZATION (1 - Tdap) 06/07/2005     AORTIC ANEURYSM SCREENING (SYSTEM ASSIGNED)  Never done     MEDICARE ANNUAL WELLNESS VISIT  11/01/2022     FALL RISK ASSESSMENT  11/23/2023     LIPID   "11/01/2026     PHQ-2 (once per calendar year)  Completed     INFLUENZA VACCINE  Completed     Pneumococcal Vaccine: 65+ Years  Completed     ZOSTER IMMUNIZATION  Completed     COVID-19 Vaccine  Completed     IPV IMMUNIZATION  Aged Out     MENINGITIS IMMUNIZATION  Aged Out     Labs reviewed in EPIC        Review of Systems   Constitutional: Negative for chills and fever.   HENT: Positive for congestion. Negative for ear pain, hearing loss and sore throat.    Eyes: Negative for pain and visual disturbance.   Respiratory: Negative for cough and shortness of breath.    Cardiovascular: Negative for chest pain, palpitations and peripheral edema.   Gastrointestinal: Negative for abdominal pain, constipation, diarrhea, heartburn, hematochezia and nausea.   Genitourinary: Positive for impotence. Negative for dysuria, frequency, genital sores, hematuria, penile discharge and urgency.   Musculoskeletal: Positive for myalgias. Negative for arthralgias and joint swelling.   Skin: Negative for rash.   Neurological: Negative for dizziness, weakness, headaches and paresthesias.   Psychiatric/Behavioral: Negative for mood changes. The patient is not nervous/anxious.          OBJECTIVE:   /80   Pulse 72   Temp 97.1  F (36.2  C)   Resp 18   Ht 1.854 m (6' 1\")   Wt 102.1 kg (225 lb)   SpO2 97%   BMI 29.69 kg/m   Estimated body mass index is 29.69 kg/m  as calculated from the following:    Height as of this encounter: 1.854 m (6' 1\").    Weight as of this encounter: 102.1 kg (225 lb).  Physical Exam  Vitals and nursing note reviewed.   Constitutional:       Appearance: Normal appearance.   HENT:      Head: Normocephalic and atraumatic.      Right Ear: Tympanic membrane normal.      Left Ear: Tympanic membrane normal.      Nose: Nose normal. No congestion or rhinorrhea.      Mouth/Throat:      Mouth: Mucous membranes are moist.   Eyes:      Conjunctiva/sclera: Conjunctivae normal.   Cardiovascular:      Rate and Rhythm: " Normal rate and regular rhythm.      Heart sounds: Normal heart sounds.   Pulmonary:      Effort: Pulmonary effort is normal.      Breath sounds: Normal breath sounds.   Abdominal:      General: Abdomen is flat. Bowel sounds are normal.      Palpations: There is no mass.      Tenderness: There is no abdominal tenderness.   Musculoskeletal:      Cervical back: Normal range of motion and neck supple.      Right lower leg: No edema.      Left lower leg: No edema.      Comments: Chronic left lower extremity foot drop from peroneal injury   Lymphadenopathy:      Cervical: No cervical adenopathy.   Skin:     General: Skin is warm and dry.   Neurological:      General: No focal deficit present.   Psychiatric:         Mood and Affect: Mood normal.         Behavior: Behavior normal.         Thought Content: Thought content normal.         Judgment: Judgment normal.           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, sequela  Comment: Chronic and unchanged  Plan:     (C91.10) Chronic lymphocytic leukemia (H)  Comment: Stable follows at [place]  Plan:     (L85.3) Dry skin dermatitis  Comment: Refilled his triamcinolone  Plan: triamcinolone (KENALOG) 0.1 % external cream            (H18.513) Fuchs' corneal dystrophy of both eyes  Comment: Follows with ophthalmology  Plan:     (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 He has never used smokeless tobacco.      Appropriate preventive services were discussed with this patient, including applicable screening as appropriate for cardiovascular disease, diabetes, osteopenia/osteoporosis, and glaucoma.  As appropriate for age/gender, discussed screening for colorectal cancer, prostate cancer, breast cancer, and cervical cancer. Checklist reviewing preventive services available has been given to the patient.    Reviewed patients plan of care and provided an AVS. The  for Get meets the Care Plan requirement. This Care Plan has been established and reviewed with the .          KISHA Simon  [facility]    Identified Health Risks:

## 2022-12-07 DIAGNOSIS — Z12.11 COLON CANCER SCREENING: ICD-10-CM

## 2022-12-15 LAB — NONINV COLON CA DNA+OCC BLD SCRN STL QL: NEGATIVE

## 2022-12-29 ENCOUNTER — TELEPHONE (OUTPATIENT)
Dept: FAMILY MEDICINE | Facility: CLINIC | Age: 72
End: 2022-12-29

## 2022-12-29 NOTE — TELEPHONE ENCOUNTER
Patient is scheduled for an appointment with you tomorrow morning.  (Appointment scheduled for in person as patient declined virtual)  OK to work him in today as a telephone/video appointment only?    Routing to provider for review.    Paulo Duran LPN on 12/29/2022 at 9:09 AM

## 2022-12-29 NOTE — TELEPHONE ENCOUNTER
Contacted patient and he had made other arrangements for care.  No appointment necessary.      Paulo Duran LPN on 12/29/2022 at 10:39 AM

## 2022-12-29 NOTE — TELEPHONE ENCOUNTER
Pt was originally scheduled with procedure nurse regarding covid and wanting covid treatment for 12.29.22. Pt stated that he doesn't think he can wait until tomorrow since he is doing so bad and got no sleep last night. He is showing multiple symptoms and is concerned for his health since he has cancer and other health issues.     Sending this message because I know treatment can be time sensitive for some patients. Patient was also really concerned about getting in asap for this treatment if possible, but would like to discuss first.

## 2023-10-26 ENCOUNTER — MYC MEDICAL ADVICE (OUTPATIENT)
Dept: FAMILY MEDICINE | Facility: CLINIC | Age: 73
End: 2023-10-26
Payer: MEDICARE

## 2024-10-23 ENCOUNTER — OFFICE VISIT (OUTPATIENT)
Dept: FAMILY MEDICINE | Facility: CLINIC | Age: 74
End: 2024-10-23
Payer: MEDICARE

## 2024-10-23 VITALS
RESPIRATION RATE: 18 BRPM | BODY MASS INDEX: 30.31 KG/M2 | DIASTOLIC BLOOD PRESSURE: 72 MMHG | SYSTOLIC BLOOD PRESSURE: 134 MMHG | TEMPERATURE: 97.7 F | WEIGHT: 228.7 LBS | HEIGHT: 73 IN | OXYGEN SATURATION: 97 % | HEART RATE: 70 BPM

## 2024-10-23 DIAGNOSIS — E78.2 MIXED HYPERLIPIDEMIA: ICD-10-CM

## 2024-10-23 DIAGNOSIS — Z23 NEED FOR TDAP VACCINATION: ICD-10-CM

## 2024-10-23 DIAGNOSIS — B02.30 HERPESVIRAL OCULAR DISEASE: ICD-10-CM

## 2024-10-23 DIAGNOSIS — C91.10 CHRONIC LYMPHOCYTIC LEUKEMIA (H): Primary | ICD-10-CM

## 2024-10-23 DIAGNOSIS — I26.94 MULTIPLE SUBSEGMENTAL PULMONARY EMBOLI WITHOUT ACUTE COR PULMONALE (H): ICD-10-CM

## 2024-10-23 DIAGNOSIS — C61 PRIMARY MALIGNANT NEOPLASM OF PROSTATE (H): ICD-10-CM

## 2024-10-23 DIAGNOSIS — H18.513 FUCHS' CORNEAL DYSTROPHY OF BOTH EYES: ICD-10-CM

## 2024-10-23 DIAGNOSIS — D68.69 OTHER THROMBOPHILIA (H): ICD-10-CM

## 2024-10-23 DIAGNOSIS — D80.1 HYPOGAMMAGLOBULINEMIA (H): ICD-10-CM

## 2024-10-23 PROCEDURE — 99214 OFFICE O/P EST MOD 30 MIN: CPT | Performed by: PHYSICIAN ASSISTANT

## 2024-10-23 RX ORDER — ATORVASTATIN CALCIUM 40 MG/1
40 TABLET, FILM COATED ORAL DAILY
Qty: 90 TABLET | Refills: 3 | Status: SHIPPED | OUTPATIENT
Start: 2024-10-23

## 2024-10-23 RX ORDER — ACYCLOVIR 400 MG/1
400 TABLET ORAL 2 TIMES DAILY
Qty: 180 TABLET | Refills: 3 | Status: SHIPPED | OUTPATIENT
Start: 2024-10-23

## 2024-10-23 NOTE — PROGRESS NOTES
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  Comment: Follow with ophthalmology stable  Plan: acyclovir (ZOVIRAX) 400 MG tablet        Follow-up with ophthalmology    (Z23) Need for Tdap vaccination  Comment: Get at pharmacy  Plan: Tdap, tetanus-diptheria-acell pertussis,         (BOOSTRIX) 5-2.5-18.5 LF-MCG/0.5 DUNIA injection            (C61) Primary malignant neoplasm of prostate (H)  Comment: Follows at [facility]  Plan: Continue follow-up care    (D80.1) Hypogammaglobulinemia (H)  Comment: [facility]  Plan: Continue follow-up there    (I26.94) Multiple subsegmental pulmonary emboli without acute cor pulmonale (H)  Comment: Secondary to COVID  Plan: Continue Xarelto    (D68.69) Other thrombophilia (H)  Comment: Stable  Plan: Continue current therapy    (H18.513) Fuchs' corneal dystrophy of both eyes  Comment: Stable  Plan: Continue with ophthalmology          BMI  Estimated body mass index is 30.17 kg/m  as calculated from the following:    Height as of this encounter: 1.854 m (6' 1\").    Weight as of this encounter: 103.7 kg (228 lb 11.2 oz).             Tuan Deutsch is a 74 year old, presenting for the following health issues:  Lipids (Refill medication )      10/23/2024    11:18 AM   Additional Questions   Roomed by SAMM Saldivar     Patient here for chronic disease management  He seeks most of his care at [facility] but would like me to refill some of his medications for him  He had a history of severe COVID infection he was hospitalized secondary to that he developed pulmonary emboli he is on Xarelto and will be on for life per patient  He does not have any documented heart disease and had a normal nuclear stress test last " "fall  Would like to have his ears checked for wax  He has a history of elevated PSA and follows at [place] for this as well  He does have a history of chronic lymphocytic leukemia and follows in [place] for this  He has hyperlipidemia his last lipid panel was acceptable in August  Patient has a history of Fuchs corneal dystrophy he follows with ophthalmology and will be seeing them again next month    History of Present Illness       Hyperlipidemia:  He presents for follow up of hyperlipidemia.   He is taking medication to lower cholesterol. He is not having myalgia or other side effects to statin medications.    Vascular Disease:  He presents for follow up of vascular disease.     He never takes nitroglycerin. He is not taking daily aspirin.    Reason for visit:  Update current prescriptions and check ears for wax buildup    He eats 4 or more servings of fruits and vegetables daily.He consumes 0 sweetened beverage(s) daily.He exercises with enough effort to increase his heart rate 30 to 60 minutes per day.  He exercises with enough effort to increase his heart rate 5 days per week.   He is taking medications regularly.                     Objective    BP (!) 134/90 (BP Location: Right arm, Patient Position: Sitting, Cuff Size: Adult Large)   Pulse 70   Temp 97.7  F (36.5  C) (Tympanic)   Resp 18   Ht 1.854 m (6' 1\")   Wt 103.7 kg (228 lb 11.2 oz)   SpO2 97%   BMI 30.17 kg/m    Body mass index is 30.17 kg/m .  Physical Exam alert attentive no acute distress  Ears minimal cerumen bilaterally lavaged and curetted with good results afterwards canals and drums are normal  Nasal mucosa is clear  Conjunctiva pink  Oropharynx benign  Neck supple no adenopathy  Lungs clear ventilated no wheeze or rhonchi cardiovascular regular rate and rhythm              Signed Electronically by: KISHA Simon    "

## 2025-01-19 ENCOUNTER — HEALTH MAINTENANCE LETTER (OUTPATIENT)
Age: 75
End: 2025-01-19

## 2025-02-05 ENCOUNTER — ALLIED HEALTH/NURSE VISIT (OUTPATIENT)
Dept: FAMILY MEDICINE | Facility: CLINIC | Age: 75
End: 2025-02-05
Payer: MEDICARE

## 2025-02-05 DIAGNOSIS — Z23 NEED FOR VACCINATION: Primary | ICD-10-CM
